# Patient Record
Sex: FEMALE | Race: WHITE | NOT HISPANIC OR LATINO | URBAN - METROPOLITAN AREA
[De-identification: names, ages, dates, MRNs, and addresses within clinical notes are randomized per-mention and may not be internally consistent; named-entity substitution may affect disease eponyms.]

---

## 2018-02-20 ENCOUNTER — OUTPATIENT (OUTPATIENT)
Dept: OUTPATIENT SERVICES | Facility: HOSPITAL | Age: 83
LOS: 1 days | Discharge: HOME | End: 2018-02-20
Payer: MEDICARE

## 2018-02-20 DIAGNOSIS — Z98.89 OTHER SPECIFIED POSTPROCEDURAL STATES: Chronic | ICD-10-CM

## 2018-02-20 LAB
INR BLD: 1.19 — HIGH (ref 0.88–1.16)
PROTHROM AB SERPL-ACNC: 13.2 SEC — HIGH (ref 9.8–12.7)

## 2018-02-20 PROCEDURE — 36415 COLL VENOUS BLD VENIPUNCTURE: CPT

## 2018-02-20 PROCEDURE — C1785: CPT

## 2018-02-20 PROCEDURE — 33228 REMV&REPLC PM GEN DUAL LEAD: CPT

## 2018-02-20 PROCEDURE — 85610 PROTHROMBIN TIME: CPT

## 2018-02-20 NOTE — PROGRESS NOTE ADULT - SUBJECTIVE AND OBJECTIVE BOX
Cardiac Electrophysiology Admission Note    History of Present Illness:  Ms. Hui is a 85 year old female with HTN, CAD s/p stent to oRCA, HLD, atrial fibrillation on coumadin and sick sinus syndrome s/p PPM, whose battery is at [facility] and presents for generator change.    Ms. Hui is unclear why she has a pacemaker but thinks it is because her heart rate was low at times.  She denies any syncope, near syncope, chest pain, SOB, orthopnea, PND, palpitations.  No device related complaints.  She now presents for an elective generator change.    Past Medical History:  as above + GERD  Past Surgical History:  PPM  Family History: Non-contributory  Social History: denies smoking, drugs, ETOH  Allergies: NKDA  Medications: Oxycodone PRN, Lasix 40mg, potassium 20mwq two tabs daily, lovastatin 20mg, coumadin 2mg, mitrampine 30mg, duloxetine 60, omeprazole  Physical:   HR: 60		BP: 125/78		O2 Sat 100%  	Temp: afebrile  GEN: NAD  HEENT: no JVD  CARDS: S1S2   LUNGS: CTAB   EXT: no edema  NEURO: no deficit noted- with confusion - but aware why she is here - HCP / daughter at bedside       A/P:  85 year old female with HTN, CAD s/p stent to oRCA, HLD, atrial fibrillation on coumadin and sick sinus syndrome s/p PPM, whose battery is at [facility] and presents for generator change. NPO and consented patient and daughter /HCP as well.  Ancef ismael procedure as per Dr. Kwong.

## 2021-03-02 ENCOUNTER — INPATIENT (INPATIENT)
Facility: HOSPITAL | Age: 86
LOS: 15 days | Discharge: NURSING, REHAB OR LONG-TERM CARE | DRG: 177 | End: 2021-03-18
Attending: INTERNAL MEDICINE | Admitting: INTERNAL MEDICINE
Payer: MEDICARE

## 2021-03-02 VITALS
OXYGEN SATURATION: 95 % | HEIGHT: 65 IN | DIASTOLIC BLOOD PRESSURE: 64 MMHG | SYSTOLIC BLOOD PRESSURE: 138 MMHG | HEART RATE: 68 BPM | RESPIRATION RATE: 20 BRPM | WEIGHT: 179.9 LBS | TEMPERATURE: 99 F

## 2021-03-02 DIAGNOSIS — Z98.89 OTHER SPECIFIED POSTPROCEDURAL STATES: Chronic | ICD-10-CM

## 2021-03-02 DIAGNOSIS — G93.40 ENCEPHALOPATHY, UNSPECIFIED: ICD-10-CM

## 2021-03-02 DIAGNOSIS — Z29.9 ENCOUNTER FOR PROPHYLACTIC MEASURES, UNSPECIFIED: ICD-10-CM

## 2021-03-02 DIAGNOSIS — U07.1 COVID-19: ICD-10-CM

## 2021-03-02 DIAGNOSIS — F03.90 UNSPECIFIED DEMENTIA WITHOUT BEHAVIORAL DISTURBANCE: ICD-10-CM

## 2021-03-02 DIAGNOSIS — I48.91 UNSPECIFIED ATRIAL FIBRILLATION: ICD-10-CM

## 2021-03-02 DIAGNOSIS — I10 ESSENTIAL (PRIMARY) HYPERTENSION: ICD-10-CM

## 2021-03-02 LAB
ALBUMIN SERPL ELPH-MCNC: 2.8 G/DL — LOW (ref 3.5–5)
ALP SERPL-CCNC: 95 U/L — SIGNIFICANT CHANGE UP (ref 40–120)
ALT FLD-CCNC: 31 U/L DA — SIGNIFICANT CHANGE UP (ref 10–60)
ANION GAP SERPL CALC-SCNC: 7 MMOL/L — SIGNIFICANT CHANGE UP (ref 5–17)
APPEARANCE UR: CLEAR — SIGNIFICANT CHANGE UP
AST SERPL-CCNC: 53 U/L — HIGH (ref 10–40)
BASOPHILS # BLD AUTO: 0.02 K/UL — SIGNIFICANT CHANGE UP (ref 0–0.2)
BASOPHILS NFR BLD AUTO: 0.5 % — SIGNIFICANT CHANGE UP (ref 0–2)
BILIRUB SERPL-MCNC: 0.6 MG/DL — SIGNIFICANT CHANGE UP (ref 0.2–1.2)
BILIRUB UR-MCNC: NEGATIVE — SIGNIFICANT CHANGE UP
BUN SERPL-MCNC: 18 MG/DL — SIGNIFICANT CHANGE UP (ref 7–18)
CALCIUM SERPL-MCNC: 8.3 MG/DL — LOW (ref 8.4–10.5)
CHLORIDE SERPL-SCNC: 110 MMOL/L — HIGH (ref 96–108)
CK SERPL-CCNC: 134 U/L — SIGNIFICANT CHANGE UP (ref 21–215)
CO2 SERPL-SCNC: 23 MMOL/L — SIGNIFICANT CHANGE UP (ref 22–31)
COLOR SPEC: YELLOW — SIGNIFICANT CHANGE UP
CREAT SERPL-MCNC: 1.04 MG/DL — SIGNIFICANT CHANGE UP (ref 0.5–1.3)
D DIMER BLD IA.RAPID-MCNC: 764 NG/ML DDU — HIGH
DIFF PNL FLD: NEGATIVE — SIGNIFICANT CHANGE UP
EOSINOPHIL # BLD AUTO: 0 K/UL — SIGNIFICANT CHANGE UP (ref 0–0.5)
EOSINOPHIL NFR BLD AUTO: 0 % — SIGNIFICANT CHANGE UP (ref 0–6)
GLUCOSE SERPL-MCNC: 92 MG/DL — SIGNIFICANT CHANGE UP (ref 70–99)
GLUCOSE UR QL: NEGATIVE — SIGNIFICANT CHANGE UP
HCT VFR BLD CALC: 45.8 % — HIGH (ref 34.5–45)
HGB BLD-MCNC: 15 G/DL — SIGNIFICANT CHANGE UP (ref 11.5–15.5)
IMM GRANULOCYTES NFR BLD AUTO: 1.2 % — SIGNIFICANT CHANGE UP (ref 0–1.5)
INR BLD: 1.39 RATIO — HIGH (ref 0.88–1.16)
KETONES UR-MCNC: NEGATIVE — SIGNIFICANT CHANGE UP
LEUKOCYTE ESTERASE UR-ACNC: NEGATIVE — SIGNIFICANT CHANGE UP
LYMPHOCYTES # BLD AUTO: 1.33 K/UL — SIGNIFICANT CHANGE UP (ref 1–3.3)
LYMPHOCYTES # BLD AUTO: 31.1 % — SIGNIFICANT CHANGE UP (ref 13–44)
MAGNESIUM SERPL-MCNC: 2.1 MG/DL — SIGNIFICANT CHANGE UP (ref 1.6–2.6)
MCHC RBC-ENTMCNC: 28.5 PG — SIGNIFICANT CHANGE UP (ref 27–34)
MCHC RBC-ENTMCNC: 32.8 GM/DL — SIGNIFICANT CHANGE UP (ref 32–36)
MCV RBC AUTO: 87.1 FL — SIGNIFICANT CHANGE UP (ref 80–100)
MONOCYTES # BLD AUTO: 0.49 K/UL — SIGNIFICANT CHANGE UP (ref 0–0.9)
MONOCYTES NFR BLD AUTO: 11.5 % — SIGNIFICANT CHANGE UP (ref 2–14)
NEUTROPHILS # BLD AUTO: 2.38 K/UL — SIGNIFICANT CHANGE UP (ref 1.8–7.4)
NEUTROPHILS NFR BLD AUTO: 55.7 % — SIGNIFICANT CHANGE UP (ref 43–77)
NITRITE UR-MCNC: NEGATIVE — SIGNIFICANT CHANGE UP
NRBC # BLD: 0 /100 WBCS — SIGNIFICANT CHANGE UP (ref 0–0)
PH UR: 5 — SIGNIFICANT CHANGE UP (ref 5–8)
PHOSPHATE SERPL-MCNC: 3 MG/DL — SIGNIFICANT CHANGE UP (ref 2.5–4.5)
PLATELET # BLD AUTO: 150 K/UL — SIGNIFICANT CHANGE UP (ref 150–400)
POTASSIUM SERPL-MCNC: 4.9 MMOL/L — SIGNIFICANT CHANGE UP (ref 3.5–5.3)
POTASSIUM SERPL-SCNC: 4.9 MMOL/L — SIGNIFICANT CHANGE UP (ref 3.5–5.3)
PROT SERPL-MCNC: 6.7 G/DL — SIGNIFICANT CHANGE UP (ref 6–8.3)
PROT UR-MCNC: 30 MG/DL
PROTHROM AB SERPL-ACNC: 16.3 SEC — HIGH (ref 10.6–13.6)
RBC # BLD: 5.26 M/UL — HIGH (ref 3.8–5.2)
RBC # FLD: 13.7 % — SIGNIFICANT CHANGE UP (ref 10.3–14.5)
SARS-COV-2 RNA SPEC QL NAA+PROBE: DETECTED
SODIUM SERPL-SCNC: 140 MMOL/L — SIGNIFICANT CHANGE UP (ref 135–145)
SP GR SPEC: 1.02 — SIGNIFICANT CHANGE UP (ref 1.01–1.02)
UROBILINOGEN FLD QL: NEGATIVE — SIGNIFICANT CHANGE UP
WBC # BLD: 4.27 K/UL — SIGNIFICANT CHANGE UP (ref 3.8–10.5)
WBC # FLD AUTO: 4.27 K/UL — SIGNIFICANT CHANGE UP (ref 3.8–10.5)

## 2021-03-02 PROCEDURE — G1004: CPT

## 2021-03-02 PROCEDURE — 71045 X-RAY EXAM CHEST 1 VIEW: CPT | Mod: 26

## 2021-03-02 PROCEDURE — 99285 EMERGENCY DEPT VISIT HI MDM: CPT

## 2021-03-02 PROCEDURE — 70450 CT HEAD/BRAIN W/O DYE: CPT | Mod: 26,ME

## 2021-03-02 PROCEDURE — 99223 1ST HOSP IP/OBS HIGH 75: CPT | Mod: GC

## 2021-03-02 RX ORDER — PANTOPRAZOLE SODIUM 20 MG/1
40 TABLET, DELAYED RELEASE ORAL DAILY
Refills: 0 | Status: DISCONTINUED | OUTPATIENT
Start: 2021-03-02 | End: 2021-03-03

## 2021-03-02 RX ORDER — ASPIRIN/CALCIUM CARB/MAGNESIUM 324 MG
81 TABLET ORAL DAILY
Refills: 0 | Status: DISCONTINUED | OUTPATIENT
Start: 2021-03-02 | End: 2021-03-18

## 2021-03-02 RX ORDER — OMEPRAZOLE 10 MG/1
0 CAPSULE, DELAYED RELEASE ORAL
Qty: 0 | Refills: 4 | DISCHARGE

## 2021-03-02 RX ORDER — DONEPEZIL HYDROCHLORIDE 10 MG/1
10 TABLET, FILM COATED ORAL AT BEDTIME
Refills: 0 | Status: DISCONTINUED | OUTPATIENT
Start: 2021-03-02 | End: 2021-03-18

## 2021-03-02 RX ORDER — DULOXETINE HYDROCHLORIDE 30 MG/1
60 CAPSULE, DELAYED RELEASE ORAL DAILY
Refills: 0 | Status: DISCONTINUED | OUTPATIENT
Start: 2021-03-02 | End: 2021-03-18

## 2021-03-02 RX ORDER — AMLODIPINE BESYLATE 2.5 MG/1
2.5 TABLET ORAL DAILY
Refills: 0 | Status: DISCONTINUED | OUTPATIENT
Start: 2021-03-02 | End: 2021-03-18

## 2021-03-02 RX ORDER — SODIUM CHLORIDE 9 MG/ML
1000 INJECTION INTRAMUSCULAR; INTRAVENOUS; SUBCUTANEOUS
Refills: 0 | Status: DISCONTINUED | OUTPATIENT
Start: 2021-03-02 | End: 2021-03-02

## 2021-03-02 RX ORDER — WARFARIN SODIUM 2.5 MG/1
0 TABLET ORAL
Qty: 0 | Refills: 5 | DISCHARGE

## 2021-03-02 RX ORDER — HALOPERIDOL DECANOATE 100 MG/ML
0.5 INJECTION INTRAMUSCULAR ONCE
Refills: 0 | Status: COMPLETED | OUTPATIENT
Start: 2021-03-02 | End: 2021-03-02

## 2021-03-02 RX ORDER — LOVASTATIN 20 MG
0 TABLET ORAL
Qty: 0 | Refills: 5 | DISCHARGE

## 2021-03-02 RX ORDER — MIRTAZAPINE 45 MG/1
15 TABLET, ORALLY DISINTEGRATING ORAL AT BEDTIME
Refills: 0 | Status: DISCONTINUED | OUTPATIENT
Start: 2021-03-02 | End: 2021-03-18

## 2021-03-02 RX ORDER — WARFARIN SODIUM 2.5 MG/1
2 TABLET ORAL ONCE
Refills: 0 | Status: DISCONTINUED | OUTPATIENT
Start: 2021-03-02 | End: 2021-03-03

## 2021-03-02 RX ADMIN — HALOPERIDOL DECANOATE 0.5 MILLIGRAM(S): 100 INJECTION INTRAMUSCULAR at 16:33

## 2021-03-02 NOTE — ED PROVIDER NOTE - PROGRESS NOTE DETAILS
COVID positive. Daughter informed. States now that patient's grandson, who lives in the home, tested positive for COVID. Patient not eating or drinking. Will require admission for IV fluids. Patient is resting comfortably, NAD. Daughter updated on patient's condition. Endorsed to Dr. Swanson

## 2021-03-02 NOTE — H&P ADULT - ATTENDING COMMENTS
Patient seen and examined on 3/2. Please refer to chart note on 3/2 for complete attending attestation.

## 2021-03-02 NOTE — H&P ADULT - ASSESSMENT
87 yo female with medical history significant for HTN, CHF, Afib on Coumadin, Dementia, comes in with lethargy and poor oral intake.    ED;  COVID positive    patient is being admitted to medicine for encephalopathy

## 2021-03-02 NOTE — H&P ADULT - PROBLEM SELECTOR PLAN 1
Presented with lethargy  CT head was negative  Likely secondary to infection and worsening of dementia  Was given IV fluids in ED  Will hold lasix as patient looks dehydrated  Will need PT   social eval

## 2021-03-02 NOTE — ED ADULT NURSE NOTE - NSIMPLEMENTINTERV_GEN_ALL_ED
Implemented All Fall Risk Interventions:  [place] to call system. Call bell, personal items and telephone within reach. Instruct patient to call for assistance. Room bathroom lighting operational. Non-slip footwear when patient is off stretcher. Physically safe environment: no spills, clutter or unnecessary equipment. Stretcher in lowest position, wheels locked, appropriate side rails in place. Provide visual cue, wrist band, yellow gown, etc. Monitor gait and stability. Monitor for mental status changes and reorient to person, place, and time. Review medications for side effects contributing to fall risk. Reinforce activity limits and safety measures with patient and family.

## 2021-03-02 NOTE — ED PROVIDER NOTE - CARE PLAN
Principal Discharge DX:	COVID-19  Secondary Diagnosis:	Altered mental status, unspecified altered mental status type

## 2021-03-02 NOTE — H&P ADULT - HISTORY OF PRESENT ILLNESS
87 yo female with medical history significant for HTN, CHF, Afib on Coumadin, Dementia, comes in with lethargy and poor oral intake. Patient's daughter reports patient has been declining for the past one week. At baseline, walks with a walker. Is confused but oriented to family members. For past 5 days, has been sleeping all day, not getting out of bed. When daughter tries to stand her up or move her to a chair, patient's legs buckle. Seems disoriented when daughter wakes her.  Pt didn't have any respiratory symptoms. Patients grandson who lives with the pt also tested positive for COVID. Pt has hx of chf on lasix 40 QD, pacemaker on warfarin 2 mg QD. Pt hasn't taken any home meds today.  89 yo female with medical history significant for HTN, CHF, Afib on Coumadin, Dementia, PPM comes in with lethargy and poor oral intake. Patient's daughter reports patient has been declining for the past one week. At baseline, walks with a walker. Is confused but oriented to family members. For past 5 days, has been sleeping all day, not getting out of bed. When daughter tries to stand her up or move her to a chair, patient's legs buckle. Seems disoriented when daughter wakes her.  Pt didn't have any respiratory symptoms. Patients grandson who lives with the pt also tested positive for COVID. Pt has hx of chf on lasix 40 QD, pacemaker on warfarin 2 mg QD. Pt hasn't taken any home meds today.

## 2021-03-02 NOTE — H&P ADULT - NSICDXPASTSURGICALHX_GEN_ALL_CORE_FT
PAST SURGICAL HISTORY:  S/P knee surgery     S/P spinal surgery S/P SPINAL CORD STIMULAR PLACEMENT

## 2021-03-02 NOTE — ED ADULT TRIAGE NOTE - CADM TRG TX PRIOR TO ARRIVAL
Fax received from [facility] with refill request for levothyroxine     According to 's note on 10.15.20:  ----- Message from Ginger Kerr MD sent at 10/15/2020  7:22 AM CDT -----  TSH is now elevated at 9.610.   Was 0.180 8 months ago. Her dose of levothyroxine was changed in Feb 2020 to 125mcq and plan was to recheck TSH in 6-8 weeks. Pt did not have it rechecked as advised but instead she has done it 8 months later! Change the levothyroxine to 125mcq Mon through Sat and 1.5 tabs on Sunday.   Needs to recheck TSH in 6-8 weeks not 8 months!!!       Patient states that she was supposed to have [facility] transfer her medication to [facility] but they were not able to. So now she is out of medication, patient would instead like this sent to [facility]. Informed patient that she is due for repeat lab work. Asked if patient can complete this prior to her appointment on Monday the 11th. Patient states that she will try. Advised patient that we will send a 30 day script for her until she does labs.    F/S 122/see ambulance record

## 2021-03-02 NOTE — CHART NOTE - NSCHARTNOTEFT_GEN_A_CORE
Patient seen and examined in ED. Spoke to patients daughter over the phone, pt was not feeling well, poor oral intake, lethargy for past 5 days. to the point she was not eating or drinking anything, unsteady gait and unable to care for her at home.  Pt didn't have any respiratory symptoms. Patients grandson who lives with the pt also tested positive for COVID.   Pt has hx of chf on lasix 40 QD, pacemaker on warfarin 2 mg QD. Pt hasn't taken any home meds today.     OE  Elderly female in NAD on room air, lethargic  dry mucosa  RRR no murmur  lungs clear to auscultation, no rales or rhonchi  Soft abd, NT, ND, +BS  No pedal edema  Awake, arousable, oriented to person    Labs. Imaging reviewed     Imp  Acute metabolic toxic encephalopathy likely secondary to COVID (non severe disease)  CHF chronic stable  ?Afib on Coumadin 2 mg QHS  Depression    D5half 80ml/hr for 12 hrs  Hold Lasix  Airborn/ contact isolation  Monitor saturation  Not a candidate  for dexa or Remdesevir given non severe disease  PT evaluation,  Daughter is willing to place her in [facility]  DVT ppx Patient seen and examined in ED. Spoke to patients daughter over the phone, pt was not feeling well, poor oral intake, lethargy for past 5 days. to the point she was not eating or drinking anything, unsteady gait and unable to care for her at home.  Pt didn't have any respiratory symptoms. Patients grandson who lives with the pt also tested positive for COVID.   Pt has hx of chf on lasix 40 QD, pacemaker on warfarin 2 mg QD. Pt hasn't taken any home meds today.     OE  Elderly female in NAD on room air, lethargic  dry mucosa  RRR no murmur  lungs clear to auscultation, no rales or rhonchi  Soft abd, NT, ND, +BS  No pedal edema  Awake, arousable, oriented to person    Labs. Imaging reviewed     Imp  Acute metabolic toxic encephalopathy likely secondary to COVID (non severe disease)  CHF chronic stable  ?Afib on Coumadin 2 mg QHS  Depression    D5half 80ml/hr for 12 hrs  Hold Lasix  Airborn/ contact isolation  Monitor saturation  Not a candidate  for dexa or Remdesevir given non severe disease  PT evaluation,  Daughter is willing to place her in [facility]  DVT ppx  Goals of care: DNR/DNI

## 2021-03-02 NOTE — H&P ADULT - NSHPPHYSICALEXAM_GEN_ALL_CORE
Vital Signs (24 Hrs):  T(C): 37.3 (03-02-21 @ 19:10), Max: 37.3 (03-02-21 @ 19:10)  HR: 62 (03-02-21 @ 19:10) (62 - 68)  BP: 105/66 (03-02-21 @ 19:10) (105/66 - 138/64)  RR: 20 (03-02-21 @ 19:10) (20 - 20)  SpO2: 96% (03-02-21 @ 19:10) (95% - 96%)

## 2021-03-02 NOTE — ED PROVIDER NOTE - CONSTITUTIONAL MENTATION
sleeping, opens eyes to voice. when asked questions, replies "I'm cold" in a clear voice/ALTERED LOC

## 2021-03-02 NOTE — ED ADULT NURSE NOTE - ISOLATION TYPE:
Airborne+Contact precautions Doxepin Pregnancy And Lactation Text: This medication is Pregnancy Category C and it isn't known if it is safe during pregnancy. It is also excreted in breast milk and breast feeding isn't recommended.

## 2021-03-02 NOTE — ED PROVIDER NOTE - OBJECTIVE STATEMENT
Patient's daughter reports patient has been declining for the past one week. At baseline, walks with a walker. Is confused but oriented to family members. For past 5 days, has been sleeping all day, not getting out of bed. When daughter tries to stand her up or move her to a chair, patient's legs buckle. Seems disoriented when daughter wakes her. PMD: Marlene

## 2021-03-02 NOTE — H&P ADULT - NSICDXFAMILYHX_GEN_ALL_CORE_FT
FAMILY HISTORY:  Family history of pancreatic cancer    Child  Still living? Unknown  Family history of breast cancer in female, Age at diagnosis: Age Unknown

## 2021-03-03 DIAGNOSIS — Z02.9 ENCOUNTER FOR ADMINISTRATIVE EXAMINATIONS, UNSPECIFIED: ICD-10-CM

## 2021-03-03 LAB
A1C WITH ESTIMATED AVERAGE GLUCOSE RESULT: 6.4 % — HIGH (ref 4–5.6)
ALBUMIN SERPL ELPH-MCNC: 2.5 G/DL — LOW (ref 3.5–5)
ALP SERPL-CCNC: 90 U/L — SIGNIFICANT CHANGE UP (ref 40–120)
ALT FLD-CCNC: 26 U/L DA — SIGNIFICANT CHANGE UP (ref 10–60)
AMMONIA BLD-MCNC: 27 UMOL/L — SIGNIFICANT CHANGE UP (ref 11–32)
ANION GAP SERPL CALC-SCNC: 7 MMOL/L — SIGNIFICANT CHANGE UP (ref 5–17)
AST SERPL-CCNC: 34 U/L — SIGNIFICANT CHANGE UP (ref 10–40)
BASOPHILS # BLD AUTO: 0.01 K/UL — SIGNIFICANT CHANGE UP (ref 0–0.2)
BASOPHILS NFR BLD AUTO: 0.3 % — SIGNIFICANT CHANGE UP (ref 0–2)
BILIRUB SERPL-MCNC: 0.5 MG/DL — SIGNIFICANT CHANGE UP (ref 0.2–1.2)
BUN SERPL-MCNC: 17 MG/DL — SIGNIFICANT CHANGE UP (ref 7–18)
CALCIUM SERPL-MCNC: 8 MG/DL — LOW (ref 8.4–10.5)
CHLORIDE SERPL-SCNC: 113 MMOL/L — HIGH (ref 96–108)
CHOLEST SERPL-MCNC: 104 MG/DL — SIGNIFICANT CHANGE UP
CO2 SERPL-SCNC: 25 MMOL/L — SIGNIFICANT CHANGE UP (ref 22–31)
CREAT SERPL-MCNC: 0.91 MG/DL — SIGNIFICANT CHANGE UP (ref 0.5–1.3)
CRP SERPL-MCNC: 29 MG/L — HIGH
CRP SERPL-MCNC: 30 MG/L — HIGH
EOSINOPHIL # BLD AUTO: 0.01 K/UL — SIGNIFICANT CHANGE UP (ref 0–0.5)
EOSINOPHIL NFR BLD AUTO: 0.3 % — SIGNIFICANT CHANGE UP (ref 0–6)
ERYTHROCYTE [SEDIMENTATION RATE] IN BLOOD: 18 MM/HR — SIGNIFICANT CHANGE UP (ref 0–20)
ESTIMATED AVERAGE GLUCOSE: 137 MG/DL — HIGH (ref 68–114)
FERRITIN SERPL-MCNC: 104 NG/ML — SIGNIFICANT CHANGE UP (ref 15–150)
FERRITIN SERPL-MCNC: 98 NG/ML — SIGNIFICANT CHANGE UP (ref 15–150)
FOLATE SERPL-MCNC: 19.1 NG/ML — SIGNIFICANT CHANGE UP
GLUCOSE SERPL-MCNC: 76 MG/DL — SIGNIFICANT CHANGE UP (ref 70–99)
HCT VFR BLD CALC: 43.9 % — SIGNIFICANT CHANGE UP (ref 34.5–45)
HDLC SERPL-MCNC: 31 MG/DL — LOW
HGB BLD-MCNC: 14.4 G/DL — SIGNIFICANT CHANGE UP (ref 11.5–15.5)
IMM GRANULOCYTES NFR BLD AUTO: 1 % — SIGNIFICANT CHANGE UP (ref 0–1.5)
INR BLD: 1.42 RATIO — HIGH (ref 0.88–1.16)
LDH SERPL L TO P-CCNC: 231 U/L — HIGH (ref 120–225)
LIPID PNL WITH DIRECT LDL SERPL: 49 MG/DL — SIGNIFICANT CHANGE UP
LYMPHOCYTES # BLD AUTO: 1.34 K/UL — SIGNIFICANT CHANGE UP (ref 1–3.3)
LYMPHOCYTES # BLD AUTO: 33.5 % — SIGNIFICANT CHANGE UP (ref 13–44)
MAGNESIUM SERPL-MCNC: 2.1 MG/DL — SIGNIFICANT CHANGE UP (ref 1.6–2.6)
MCHC RBC-ENTMCNC: 28.5 PG — SIGNIFICANT CHANGE UP (ref 27–34)
MCHC RBC-ENTMCNC: 32.8 GM/DL — SIGNIFICANT CHANGE UP (ref 32–36)
MCV RBC AUTO: 86.8 FL — SIGNIFICANT CHANGE UP (ref 80–100)
MONOCYTES # BLD AUTO: 0.44 K/UL — SIGNIFICANT CHANGE UP (ref 0–0.9)
MONOCYTES NFR BLD AUTO: 11 % — SIGNIFICANT CHANGE UP (ref 2–14)
NEUTROPHILS # BLD AUTO: 2.16 K/UL — SIGNIFICANT CHANGE UP (ref 1.8–7.4)
NEUTROPHILS NFR BLD AUTO: 53.9 % — SIGNIFICANT CHANGE UP (ref 43–77)
NON HDL CHOLESTEROL: 73 MG/DL — SIGNIFICANT CHANGE UP
NRBC # BLD: 0 /100 WBCS — SIGNIFICANT CHANGE UP (ref 0–0)
PHOSPHATE SERPL-MCNC: 2.9 MG/DL — SIGNIFICANT CHANGE UP (ref 2.5–4.5)
PLATELET # BLD AUTO: 156 K/UL — SIGNIFICANT CHANGE UP (ref 150–400)
POTASSIUM SERPL-MCNC: 3.8 MMOL/L — SIGNIFICANT CHANGE UP (ref 3.5–5.3)
POTASSIUM SERPL-SCNC: 3.8 MMOL/L — SIGNIFICANT CHANGE UP (ref 3.5–5.3)
PROCALCITONIN SERPL-MCNC: 0.1 NG/ML — SIGNIFICANT CHANGE UP (ref 0.02–0.1)
PROCALCITONIN SERPL-MCNC: 0.12 NG/ML — HIGH (ref 0.02–0.1)
PROT SERPL-MCNC: 5.8 G/DL — LOW (ref 6–8.3)
PROTHROM AB SERPL-ACNC: 16.6 SEC — HIGH (ref 10.6–13.6)
RBC # BLD: 5.06 M/UL — SIGNIFICANT CHANGE UP (ref 3.8–5.2)
RBC # FLD: 13.8 % — SIGNIFICANT CHANGE UP (ref 10.3–14.5)
SARS-COV-2 IGG SERPL QL IA: NEGATIVE — SIGNIFICANT CHANGE UP
SARS-COV-2 IGM SERPL IA-ACNC: 0.07 INDEX — SIGNIFICANT CHANGE UP
SODIUM SERPL-SCNC: 145 MMOL/L — SIGNIFICANT CHANGE UP (ref 135–145)
TRIGL SERPL-MCNC: 122 MG/DL — SIGNIFICANT CHANGE UP
TSH SERPL-MCNC: 1.61 UU/ML — SIGNIFICANT CHANGE UP (ref 0.34–4.82)
VIT B12 SERPL-MCNC: 1223 PG/ML — SIGNIFICANT CHANGE UP (ref 232–1245)
WBC # BLD: 4 K/UL — SIGNIFICANT CHANGE UP (ref 3.8–10.5)
WBC # FLD AUTO: 4 K/UL — SIGNIFICANT CHANGE UP (ref 3.8–10.5)

## 2021-03-03 PROCEDURE — 99233 SBSQ HOSP IP/OBS HIGH 50: CPT

## 2021-03-03 RX ORDER — PANTOPRAZOLE SODIUM 20 MG/1
40 TABLET, DELAYED RELEASE ORAL
Refills: 0 | Status: DISCONTINUED | OUTPATIENT
Start: 2021-03-03 | End: 2021-03-18

## 2021-03-03 RX ORDER — WARFARIN SODIUM 2.5 MG/1
3 TABLET ORAL ONCE
Refills: 0 | Status: COMPLETED | OUTPATIENT
Start: 2021-03-03 | End: 2021-03-03

## 2021-03-03 RX ADMIN — DONEPEZIL HYDROCHLORIDE 10 MILLIGRAM(S): 10 TABLET, FILM COATED ORAL at 22:06

## 2021-03-03 RX ADMIN — Medication 81 MILLIGRAM(S): at 11:53

## 2021-03-03 RX ADMIN — PANTOPRAZOLE SODIUM 40 MILLIGRAM(S): 20 TABLET, DELAYED RELEASE ORAL at 11:53

## 2021-03-03 RX ADMIN — MIRTAZAPINE 15 MILLIGRAM(S): 45 TABLET, ORALLY DISINTEGRATING ORAL at 22:06

## 2021-03-03 RX ADMIN — AMLODIPINE BESYLATE 2.5 MILLIGRAM(S): 2.5 TABLET ORAL at 05:37

## 2021-03-03 RX ADMIN — WARFARIN SODIUM 3 MILLIGRAM(S): 2.5 TABLET ORAL at 22:05

## 2021-03-03 RX ADMIN — DULOXETINE HYDROCHLORIDE 60 MILLIGRAM(S): 30 CAPSULE, DELAYED RELEASE ORAL at 11:53

## 2021-03-03 NOTE — PHYSICAL THERAPY INITIAL EVALUATION ADULT - PERTINENT HX OF CURRENT PROBLEM, REHAB EVAL
lethargy; generalized weakness; difficulty walking at home; COVID-19 positive; visually impaired (macular degeneration on both eyes as per daughter); hearing impaired

## 2021-03-03 NOTE — PATIENT PROFILE ADULT - NSPROPTRIGHTNOTIFYOBTAINDET_GEN_A_NUR
EXAM note    History  Helga Rios is a 54 year old female who presents follow-up of hypertriglyceridemia, during her last lipid panel, her triglycerides are slightly elevated at 255. She denies any dizziness, headaches or lightheadedness, no chest pain, shortness of breath or palpitations. However, patient has noted worsening memory. She had an MRI due to sinus symptoms, showed lesions in the brain, she saw the neurologist who recommended evaluation by neuropsychology, also recommended discussing triglycerides, however, patient does have a history of chronic pain taking morphine, and addition to alprazolam. Labs also ordered by neurology as well.    I have reviewed the past medical, family and social history sections including the medications and allergies listed in the above medical record as well as the nursing notes.    medical history  Past Medical History:   Diagnosis Date   • Anxiety    • Osteoporosis, unspecified 1/19/01       mEDICATIONS  Current Outpatient Medications   Medication Sig   • ranolazine (RANEXA) 500 MG 12 hr tablet Take 1 tablet by mouth 2 times daily.   • Azelastine-Fluticasone-NaCl 137-50 & 0.9 MCG/ACT-% Kit    • Fluticasone Propionate 93 MCG/ACT Exhaler Suspension    • fluticasone (FLONASE) 50 MCG/ACT nasal spray Spray 1 spray in each nostril daily.   • ibuprofen (MOTRIN) 200 MG tablet Take 600 mg by mouth every 6 hours as needed for Pain.   • ALPRAZolam (XANAX) 0.5 MG tablet Take 0.5 mg by mouth as needed.    • morphine (CASSANDRA) 30 MG 24 hr capsule Take 30 mg by mouth 2 times daily. Indications: Pain Lower back pain   • oxyCODONE/APAP (PERCOCET)  MG per tablet Take 1 tablet by mouth as needed for Pain. Lower back pain     No current facility-administered medications for this visit.        Review of systems  Constitutional:  Patient denies fever, chills, fatigue or malaise.  Eyes:  Denies visual disturbance, pain, burning or itching.  Respiratory:  Denies cough, shortness of breath,  wheezing, chest tightness.  Cardiovascular:  Denies chest pain, edema, palpitations.  Gastrointestinal:  Denies abdominal pain, nausea, vomiting, bloody stools or diarrhea.  Genitourinary:  Denies dysuria, urinary frequency, hematuria or nocturia.  Musculoskeletal:  Denies back pain, neck pain, joint pain or myalgias.  Integument:  Denies rash, itching.  Neurologic:  Denies headache, focal weakness, dizziness, syncope.  Endocrine:  Denies polyuria, polydipsia or temperature intolerance.  Psychiatric:  Denies depression or anxiety, sleep disturbance, suicidal ideation.      Physical Exam  Vital Signs:    Vitals:    07/05/19 1401   BP: 126/80   Pulse: 84   SpO2: 93%   Weight: 70.3 kg   Height: 5' 6\" (1.676 m)     Constitutional:  Well-developed, well-nourished. no acute distress.  Integument:  Warm. Dry. No erythema. No rash.  Neck:  Supple. No cervical lymphadenopathy. no thyromegaly. No carotid bruits.  Cardiovascular:  Normal heart rate. Normal rhythm. No murmurs. No rubs. No gallops. 2+ radial pulses. 2+ dorsalis pedis and posterior tibial pulses.  Respiratory:  Normal breath sounds. No respiratory distress. No wheezing, rales or rhonchi.  Gastrointestinal:  Bowel sounds normal. Soft. No tenderness. No distention. No masses. No hepatosplenomegaly.  Neurologic:  Alert and oriented x 3. Normal motor function. Normal sensory function.   Psychologic: Normal mood and normal behavior.    Assessment AND Plan  1) essential hypertriglyceridemia- patient counseled on diet condition exercise, she may try over-the-counter fissure Krill oil, flaxseed. We'll check lipids. If continues be elevated, may need to consider medication such as fenofibrate or gemfibrozil.    Patient verbalized understanding agreed to plan, return to clinic as scheduled or sooner p.r.n.     unable to assess

## 2021-03-03 NOTE — PROGRESS NOTE ADULT - PROBLEM SELECTOR PLAN 7
Patient from home   PT reccomending rehab  will need COVID negative prior to discharge Patient from home   PT recommending rehab  will need COVID negative prior to discharge

## 2021-03-03 NOTE — PHYSICAL THERAPY INITIAL EVALUATION ADULT - GENERAL OBSERVATIONS, REHAB EVAL
awake, alert, confused; oriented to self only; able to follow simple commands; + peripheral IV access on left forearm; currently breathing on room air

## 2021-03-03 NOTE — PHARMACOTHERAPY INTERVENTION NOTE - COMMENTS
87 y/o F on protonix 40mg QD day #2, recommended TO reevaluate and consider PO switch as pt qualifies;

## 2021-03-03 NOTE — PROGRESS NOTE ADULT - ASSESSMENT
87 yo female with medical history significant for HTN, CHF, Afib on Coumadin, Dementia, comes in with lethargy and poor oral intake admitted for acute encephalopathy secondary to COVID.

## 2021-03-03 NOTE — PHYSICAL THERAPY INITIAL EVALUATION ADULT - IMPAIRMENTS FOUND, PT EVAL
aerobic capacity/endurance/arousal, attention, and cognition/cognitive impairment/gait, locomotion, and balance/muscle strength/poor safety awareness/posture

## 2021-03-03 NOTE — PHYSICAL THERAPY INITIAL EVALUATION ADULT - ADDITIONAL COMMENTS
[facility] as per discussion with daughter Bianca, patient was previously independent in household ambulation using a rolling walker

## 2021-03-03 NOTE — PROGRESS NOTE ADULT - SUBJECTIVE AND OBJECTIVE BOX
-*-*-*INCOMPLETE     HPI:  87 yo female with medical history significant for HTN, CHF, Afib on Coumadin, Dementia, PPM comes in with lethargy and poor oral intake. Patient's daughter reports patient has been declining for the past one week. At baseline, walks with a walker. Is confused but oriented to family members. For past 5 days, has been sleeping all day, not getting out of bed. When daughter tries to stand her up or move her to a chair, patient's legs buckle. Seems disoriented when daughter wakes her.  Pt didn't have any respiratory symptoms. Patients grandson who lives with the pt also tested positive for COVID. Pt has hx of chf on lasix 40 QD, pacemaker on warfarin 2 mg QD. Pt hasn't taken any home meds today.  (02 Mar 2021 20:11)      Patient is a 88y old  Female who presents with a chief complaint of lethargy (02 Mar 2021 20:11)      INTERVAL HPI/OVERNIGHT EVENTS:  T(C): 36.4 (21 @ 05:17), Max: 37.3 (21 @ 19:10)  HR: 60 (21 @ 05:17) (59 - 68)  BP: 121/68 (21 @ 05:17) (105/66 - 149/50)  RR: 19 (21 @ 05:17) (19 - 20)  SpO2: 97% (21 @ 05:17) (95% - 97%)  Wt(kg): --  I&O's Summary      REVIEW OF SYSTEMS: denies fever, chills, SOB, palpitations, chest pain, abdominal pain, nausea, vomitting, diarrhea, constipation, dizziness    MEDICATIONS  (STANDING):  amLODIPine   Tablet 2.5 milliGRAM(s) Oral daily  aspirin enteric coated 81 milliGRAM(s) Oral daily  donepezil 10 milliGRAM(s) Oral at bedtime  DULoxetine 60 milliGRAM(s) Oral daily  mirtazapine 15 milliGRAM(s) Oral at bedtime  pantoprazole  Injectable 40 milliGRAM(s) IV Push daily  warfarin 2 milliGRAM(s) Oral once    MEDICATIONS  (PRN):      PHYSICAL EXAM:  GENERAL: NAD, well-groomed, well-developed  HEAD:  Atraumatic, Normocephalic  EYES: EOMI, PERRLA, conjunctiva and sclera clear  ENMT: No tonsillar erythema, exudates, or enlargement; Moist mucous membranes, Good dentition, No lesions  NECK: Supple, No JVD, Normal thyroid  NERVOUS SYSTEM:  Alert & Oriented X3, Good concentration; Motor Strength 5/5 B/L upper and lower extremities; DTRs 2+ intact and symmetric  CHEST/LUNG: Clear to percussion bilaterally; No rales, rhonchi, wheezing, or rubs  HEART: Regular rate and rhythm; No murmurs, rubs, or gallops  ABDOMEN: Soft, Nontender, Nondistended; Bowel sounds present  EXTREMITIES:  2+ Peripheral Pulses, No clubbing, cyanosis, or edema  LYMPH: No lymphadenopathy noted  SKIN: No rashes or lesions  LABS:                        15.0   4.27  )-----------( 150      ( 02 Mar 2021 16:16 )             45.8     03-02    140  |  110<H>  |  18  ----------------------------<  92  4.9   |  23  |  1.04    Ca    8.3<L>      02 Mar 2021 16:16  Phos  3.0     03-02  Mg     2.1     03-02    TPro  6.7  /  Alb  2.8<L>  /  TBili  0.6  /  DBili  x   /  AST  53<H>  /  ALT  31  /  AlkPhos  95  03-02    PT/INR - ( 02 Mar 2021 19:08 )   PT: 16.3 sec;   INR: 1.39 ratio           Urinalysis Basic - ( 02 Mar 2021 16:57 )    Color: Yellow / Appearance: Clear / S.020 / pH: x  Gluc: x / Ketone: Negative  / Bili: Negative / Urobili: Negative   Blood: x / Protein: 30 mg/dL / Nitrite: Negative   Leuk Esterase: Negative / RBC: 0-2 /HPF / WBC 0-2 /HPF   Sq Epi: x / Non Sq Epi: Occasional /HPF / Bacteria: Many /HPF      CAPILLARY BLOOD GLUCOSE            Urinalysis Basic - ( 02 Mar 2021 16:57 )    Color: Yellow / Appearance: Clear / S.020 / pH: x  Gluc: x / Ketone: Negative  / Bili: Negative / Urobili: Negative   Blood: x / Protein: 30 mg/dL / Nitrite: Negative   Leuk Esterase: Negative / RBC: 0-2 /HPF / WBC 0-2 /HPF   Sq Epi: x / Non Sq Epi: Occasional /HPF / Bacteria: Many /HPF     Patient is a 88y old  Female who presents with a chief complaint of lethargy (02 Mar 2021 20:11)    HPI:  89 yo female with medical history significant for HTN, CHF, Afib on Coumadin, Dementia, PPM comes in with lethargy and poor oral intake. Patient admitted for AMS, found to be COVID + chest xray with B/L infiltrates. Physical therapy consult recommends rehab, will need negative COVID prior to D.C.   INR subtherapeutic coumadin dose increased to 3mg    INTERVAL HPI/OVERNIGHT EVENTS:  T(C): 36.4 (21 @ 05:17), Max: 37.3 (21 @ 19:10)  HR: 60 (21 @ 05:17) (59 - 68)  BP: 121/68 (21 @ 05:17) (105/66 - 149/50)  RR: 19 (21 @ 05:17) (19 - 20)  SpO2: 97% (21 @ 05:17) (95% - 97%)  Wt(kg): --  I&O's Summary      REVIEW OF SYSTEMS: unable to participate     MEDICATIONS  (STANDING):  amLODIPine   Tablet 2.5 milliGRAM(s) Oral daily  aspirin enteric coated 81 milliGRAM(s) Oral daily  donepezil 10 milliGRAM(s) Oral at bedtime  DULoxetine 60 milliGRAM(s) Oral daily  mirtazapine 15 milliGRAM(s) Oral at bedtime  pantoprazole  Injectable 40 milliGRAM(s) IV Push daily  warfarin 2 milliGRAM(s) Oral once    MEDICATIONS  (PRN):    PHYSICAL EXAM:  GENERAL: chronically ill appearing female   HEAD:  Atraumatic, Normocephalic  EYES: EOMI, PERRLA, conjunctiva and sclera clear  ENMT: No tonsillar erythema, exudates, or enlargement;   NECK: Supple, No JVD, Normal thyroid  NERVOUS SYSTEM:  Alert & Oriented X1,   CHEST/LUNG: B.L Rhonci   HEART: Regular rate and rhythm; No murmurs, rubs, or gallops  ABDOMEN: Soft, Nontender, Nondistended; Bowel sounds present  EXTREMITIES:  2+ Peripheral Pulses, No clubbing, cyanosis, or edema  LYMPH: No lymphadenopathy noted  SKIN: No rashes or lesions  LABS:                        15.0   4.27  )-----------( 150      ( 02 Mar 2021 16:16 )             45.8     03-02    140  |  110<H>  |  18  ----------------------------<  92  4.9   |  23  |  1.04    Ca    8.3<L>      02 Mar 2021 16:16  Phos  3.0     03-02  Mg     2.1     03-02    TPro  6.7  /  Alb  2.8<L>  /  TBili  0.6  /  DBili  x   /  AST  53<H>  /  ALT  31  /  AlkPhos  95  03-02    PT/INR - ( 02 Mar 2021 19:08 )   PT: 16.3 sec;   INR: 1.39 ratio           Urinalysis Basic - ( 02 Mar 2021 16:57 )    Color: Yellow / Appearance: Clear / S.020 / pH: x  Gluc: x / Ketone: Negative  / Bili: Negative / Urobili: Negative   Blood: x / Protein: 30 mg/dL / Nitrite: Negative   Leuk Esterase: Negative / RBC: 0-2 /HPF / WBC 0-2 /HPF   Sq Epi: x / Non Sq Epi: Occasional /HPF / Bacteria: Many /HPF      CAPILLARY BLOOD GLUCOSE            Urinalysis Basic - ( 02 Mar 2021 16:57 )    Color: Yellow / Appearance: Clear / S.020 / pH: x  Gluc: x / Ketone: Negative  / Bili: Negative / Urobili: Negative   Blood: x / Protein: 30 mg/dL / Nitrite: Negative   Leuk Esterase: Negative / RBC: 0-2 /HPF / WBC 0-2 /HPF   Sq Epi: x / Non Sq Epi: Occasional /HPF / Bacteria: Many /HPF

## 2021-03-04 LAB
ANION GAP SERPL CALC-SCNC: 5 MMOL/L — SIGNIFICANT CHANGE UP (ref 5–17)
BUN SERPL-MCNC: 16 MG/DL — SIGNIFICANT CHANGE UP (ref 7–18)
CALCIUM SERPL-MCNC: 8 MG/DL — LOW (ref 8.4–10.5)
CHLORIDE SERPL-SCNC: 113 MMOL/L — HIGH (ref 96–108)
CO2 SERPL-SCNC: 25 MMOL/L — SIGNIFICANT CHANGE UP (ref 22–31)
CREAT SERPL-MCNC: 0.96 MG/DL — SIGNIFICANT CHANGE UP (ref 0.5–1.3)
CULTURE RESULTS: SIGNIFICANT CHANGE UP
GLUCOSE SERPL-MCNC: 85 MG/DL — SIGNIFICANT CHANGE UP (ref 70–99)
HCT VFR BLD CALC: 46.3 % — HIGH (ref 34.5–45)
HGB BLD-MCNC: 15.1 G/DL — SIGNIFICANT CHANGE UP (ref 11.5–15.5)
INR BLD: 1.57 RATIO — HIGH (ref 0.88–1.16)
MCHC RBC-ENTMCNC: 28.1 PG — SIGNIFICANT CHANGE UP (ref 27–34)
MCHC RBC-ENTMCNC: 32.6 GM/DL — SIGNIFICANT CHANGE UP (ref 32–36)
MCV RBC AUTO: 86.1 FL — SIGNIFICANT CHANGE UP (ref 80–100)
NRBC # BLD: 0 /100 WBCS — SIGNIFICANT CHANGE UP (ref 0–0)
PLATELET # BLD AUTO: 159 K/UL — SIGNIFICANT CHANGE UP (ref 150–400)
POTASSIUM SERPL-MCNC: 4.3 MMOL/L — SIGNIFICANT CHANGE UP (ref 3.5–5.3)
POTASSIUM SERPL-SCNC: 4.3 MMOL/L — SIGNIFICANT CHANGE UP (ref 3.5–5.3)
PROTHROM AB SERPL-ACNC: 18.3 SEC — HIGH (ref 10.6–13.6)
RBC # BLD: 5.38 M/UL — HIGH (ref 3.8–5.2)
RBC # FLD: 13.4 % — SIGNIFICANT CHANGE UP (ref 10.3–14.5)
SODIUM SERPL-SCNC: 143 MMOL/L — SIGNIFICANT CHANGE UP (ref 135–145)
SPECIMEN SOURCE: SIGNIFICANT CHANGE UP
WBC # BLD: 4.22 K/UL — SIGNIFICANT CHANGE UP (ref 3.8–10.5)
WBC # FLD AUTO: 4.22 K/UL — SIGNIFICANT CHANGE UP (ref 3.8–10.5)

## 2021-03-04 PROCEDURE — 99232 SBSQ HOSP IP/OBS MODERATE 35: CPT | Mod: GC

## 2021-03-04 RX ORDER — WARFARIN SODIUM 2.5 MG/1
3 TABLET ORAL ONCE
Refills: 0 | Status: COMPLETED | OUTPATIENT
Start: 2021-03-04 | End: 2021-03-04

## 2021-03-04 RX ADMIN — DULOXETINE HYDROCHLORIDE 60 MILLIGRAM(S): 30 CAPSULE, DELAYED RELEASE ORAL at 11:21

## 2021-03-04 RX ADMIN — AMLODIPINE BESYLATE 2.5 MILLIGRAM(S): 2.5 TABLET ORAL at 07:06

## 2021-03-04 RX ADMIN — DONEPEZIL HYDROCHLORIDE 10 MILLIGRAM(S): 10 TABLET, FILM COATED ORAL at 21:42

## 2021-03-04 RX ADMIN — MIRTAZAPINE 15 MILLIGRAM(S): 45 TABLET, ORALLY DISINTEGRATING ORAL at 21:41

## 2021-03-04 RX ADMIN — WARFARIN SODIUM 3 MILLIGRAM(S): 2.5 TABLET ORAL at 21:41

## 2021-03-04 RX ADMIN — PANTOPRAZOLE SODIUM 40 MILLIGRAM(S): 20 TABLET, DELAYED RELEASE ORAL at 09:39

## 2021-03-04 RX ADMIN — Medication 81 MILLIGRAM(S): at 11:21

## 2021-03-04 NOTE — PROGRESS NOTE ADULT - SUBJECTIVE AND OBJECTIVE BOX
NP Note discussed with  Primary Attending    Patient is a 88y old  Female who presents with a chief complaint of lethargy (03 Mar 2021 08:03)    HPI:  89 yo female with medical history significant for HTN, CHF, Afib on Coumadin, Dementia, PPM comes in with lethargy and poor oral intake. Patient admitted for AMS, found to be COVID + chest xray with B/L infiltrates. INR subtherapeutic coumadin dose increased  to 3mg, Physical therapy consult recommends rehab, will need negative COVID prior to D.C. Pending SW consult      INTERVAL HPI/OVERNIGHT EVENTS: NONE    MEDICATIONS  (STANDING):  amLODIPine   Tablet 2.5 milliGRAM(s) Oral daily  aspirin enteric coated 81 milliGRAM(s) Oral daily  donepezil 10 milliGRAM(s) Oral at bedtime  DULoxetine 60 milliGRAM(s) Oral daily  mirtazapine 15 milliGRAM(s) Oral at bedtime  pantoprazole    Tablet 40 milliGRAM(s) Oral before breakfast    MEDICATIONS  (PRN):  __________________________________________________  REVIEW OF SYSTEMS:    unable to participate due to MS      Vital Signs Last 24 Hrs  T(C): 36.9 (04 Mar 2021 13:19), Max: 36.9 (04 Mar 2021 13:19)  T(F): 98.5 (04 Mar 2021 13:19), Max: 98.5 (04 Mar 2021 13:19)  HR: 50 (04 Mar 2021 13:19) (50 - 72)  BP: 117/44 (04 Mar 2021 13:19) (114/84 - 149/64)  BP(mean): 62 (04 Mar 2021 13:19) (62 - 84)  RR: 16 (04 Mar 2021 13:19) (16 - 18)  SpO2: 94% (04 Mar 2021 13:19) (94% - 99%)    ________________________________________________    PHYSICAL EXAM:  GENERAL: chronically ill appearing female   HEAD:  Atraumatic, Normocephalic  EYES: EOMI, PERRLA, conjunctiva and sclera clear  ENMT: No tonsillar erythema, exudates, or enlargement;   NECK: Supple, No JVD, Normal thyroid  NERVOUS SYSTEM:  Alert & Oriented X1,   CHEST/LUNG: B.L Rhonci   HEART: Regular rate and rhythm; No murmurs, rubs, or gallops  ABDOMEN: Soft, Nontender, Nondistended; Bowel sounds present  EXTREMITIES:  2+ Peripheral Pulses, No clubbing, cyanosis, or edema  LYMPH: No lymphadenopathy noted  SKIN: No rashes or lesions    _________________________________________________  LABS:                        15.1   4.22  )-----------( 159      ( 04 Mar 2021 06:44 )             46.3     03-04    143  |  113<H>  |  16  ----------------------------<  85  4.3   |  25  |  0.96    Ca    8.0<L>      04 Mar 2021 06:44  Phos  2.9     03-03  Mg     2.1     03-03    TPro  5.8<L>  /  Alb  2.5<L>  /  TBili  0.5  /  DBili  x   /  AST  34  /  ALT  26  /  AlkPhos  90  03-03    PT/INR - ( 04 Mar 2021 06:44 )   PT: 18.3 sec;   INR: 1.57 ratio        Plan of care was discussed with patient and /or primary care giver; all questions and concerns were addressed and care was aligned with patient's wishes.

## 2021-03-04 NOTE — PROGRESS NOTE ADULT - PROBLEM SELECTOR PLAN 7
Patient from home   PT recommending rehab  will need COVID negative prior to discharge  pending SW consult

## 2021-03-05 LAB
ANION GAP SERPL CALC-SCNC: 9 MMOL/L — SIGNIFICANT CHANGE UP (ref 5–17)
APTT BLD: 34.5 SEC — SIGNIFICANT CHANGE UP (ref 27.5–35.5)
BUN SERPL-MCNC: 15 MG/DL — SIGNIFICANT CHANGE UP (ref 7–18)
CALCIUM SERPL-MCNC: 7.7 MG/DL — LOW (ref 8.4–10.5)
CHLORIDE SERPL-SCNC: 114 MMOL/L — HIGH (ref 96–108)
CO2 SERPL-SCNC: 21 MMOL/L — LOW (ref 22–31)
CREAT SERPL-MCNC: 0.97 MG/DL — SIGNIFICANT CHANGE UP (ref 0.5–1.3)
GLUCOSE SERPL-MCNC: 101 MG/DL — HIGH (ref 70–99)
HCT VFR BLD CALC: 40.4 % — SIGNIFICANT CHANGE UP (ref 34.5–45)
HGB BLD-MCNC: 13.6 G/DL — SIGNIFICANT CHANGE UP (ref 11.5–15.5)
INR BLD: 2.34 RATIO — HIGH (ref 0.88–1.16)
MCHC RBC-ENTMCNC: 28.5 PG — SIGNIFICANT CHANGE UP (ref 27–34)
MCHC RBC-ENTMCNC: 33.7 GM/DL — SIGNIFICANT CHANGE UP (ref 32–36)
MCV RBC AUTO: 84.7 FL — SIGNIFICANT CHANGE UP (ref 80–100)
NRBC # BLD: 0 /100 WBCS — SIGNIFICANT CHANGE UP (ref 0–0)
PLATELET # BLD AUTO: 143 K/UL — LOW (ref 150–400)
POTASSIUM SERPL-MCNC: 3.5 MMOL/L — SIGNIFICANT CHANGE UP (ref 3.5–5.3)
POTASSIUM SERPL-SCNC: 3.5 MMOL/L — SIGNIFICANT CHANGE UP (ref 3.5–5.3)
PROTHROM AB SERPL-ACNC: 26.7 SEC — HIGH (ref 10.6–13.6)
RBC # BLD: 4.77 M/UL — SIGNIFICANT CHANGE UP (ref 3.8–5.2)
RBC # FLD: 13.5 % — SIGNIFICANT CHANGE UP (ref 10.3–14.5)
SODIUM SERPL-SCNC: 144 MMOL/L — SIGNIFICANT CHANGE UP (ref 135–145)
WBC # BLD: 4.94 K/UL — SIGNIFICANT CHANGE UP (ref 3.8–10.5)
WBC # FLD AUTO: 4.94 K/UL — SIGNIFICANT CHANGE UP (ref 3.8–10.5)

## 2021-03-05 PROCEDURE — 99232 SBSQ HOSP IP/OBS MODERATE 35: CPT | Mod: GC

## 2021-03-05 RX ORDER — WARFARIN SODIUM 2.5 MG/1
2 TABLET ORAL ONCE
Refills: 0 | Status: COMPLETED | OUTPATIENT
Start: 2021-03-05 | End: 2021-03-05

## 2021-03-05 RX ADMIN — DULOXETINE HYDROCHLORIDE 60 MILLIGRAM(S): 30 CAPSULE, DELAYED RELEASE ORAL at 12:33

## 2021-03-05 RX ADMIN — MIRTAZAPINE 15 MILLIGRAM(S): 45 TABLET, ORALLY DISINTEGRATING ORAL at 22:33

## 2021-03-05 RX ADMIN — PANTOPRAZOLE SODIUM 40 MILLIGRAM(S): 20 TABLET, DELAYED RELEASE ORAL at 06:16

## 2021-03-05 RX ADMIN — WARFARIN SODIUM 2 MILLIGRAM(S): 2.5 TABLET ORAL at 22:32

## 2021-03-05 RX ADMIN — Medication 81 MILLIGRAM(S): at 12:33

## 2021-03-05 RX ADMIN — DONEPEZIL HYDROCHLORIDE 10 MILLIGRAM(S): 10 TABLET, FILM COATED ORAL at 22:32

## 2021-03-05 RX ADMIN — AMLODIPINE BESYLATE 2.5 MILLIGRAM(S): 2.5 TABLET ORAL at 05:16

## 2021-03-05 NOTE — PROGRESS NOTE ADULT - PROBLEM SELECTOR PLAN 3
Has h/o Afib, on coumadin  INR Subtherapeutic  increased dose to 3mg Has h/o Afib, on coumadin  INR now within therapeutic range  will continue 2mg for the next 3 days   trend INR

## 2021-03-05 NOTE — PROGRESS NOTE ADULT - SUBJECTIVE AND OBJECTIVE BOX
-*-**-INCOMPLETE     NP Note discussed with  Primary Attending    Patient is a 88y old  Female who presents with a chief complaint of lethargy (04 Mar 2021 17:27)      INTERVAL HPI/OVERNIGHT EVENTS: no new complaints    MEDICATIONS  (STANDING):  amLODIPine   Tablet 2.5 milliGRAM(s) Oral daily  aspirin enteric coated 81 milliGRAM(s) Oral daily  donepezil 10 milliGRAM(s) Oral at bedtime  DULoxetine 60 milliGRAM(s) Oral daily  mirtazapine 15 milliGRAM(s) Oral at bedtime  pantoprazole    Tablet 40 milliGRAM(s) Oral before breakfast  warfarin 2 milliGRAM(s) Oral once    MEDICATIONS  (PRN):      __________________________________________________  REVIEW OF SYSTEMS:    CONSTITUTIONAL: No fever,   EYES: no acute visual disturbances  NECK: No pain or stiffness  RESPIRATORY: No cough; No shortness of breath  CARDIOVASCULAR: No chest pain, no palpitations  GASTROINTESTINAL: No pain. No nausea or vomiting; No diarrhea   NEUROLOGICAL: No headache or numbness, no tremors  MUSCULOSKELETAL: No joint pain, no muscle pain  GENITOURINARY: no dysuria, no frequency, no hesitancy  PSYCHIATRY: no depression , no anxiety  ALL OTHER  ROS negative        Vital Signs Last 24 Hrs  T(C): 36.6 (05 Mar 2021 05:19), Max: 36.8 (04 Mar 2021 21:00)  T(F): 97.8 (05 Mar 2021 05:19), Max: 98.2 (04 Mar 2021 21:00)  HR: 56 (05 Mar 2021 05:19) (56 - 59)  BP: 152/78 (05 Mar 2021 05:19) (99/57 - 152/78)  BP(mean): --  RR: 16 (05 Mar 2021 05:19) (16 - 17)  SpO2: 99% (05 Mar 2021 05:19) (93% - 99%)    ________________________________________________  PHYSICAL EXAM:  GENERAL: NAD  HEENT: Normocephalic;  conjunctivae and sclerae clear; moist mucous membranes;   NECK : supple  CHEST/LUNG: Clear to auscultation bilaterally with good air entry   HEART: S1 S2  regular; no murmurs, gallops or rubs  ABDOMEN: Soft, Nontender, Nondistended; Bowel sounds present  EXTREMITIES: no cyanosis; no edema; no calf tenderness  SKIN: warm and dry; no rash  NERVOUS SYSTEM:  Awake and alert; Oriented  to place, person and time ; no new deficits    _________________________________________________  LABS:                        13.6   4.94  )-----------( 143      ( 05 Mar 2021 06:37 )             40.4     03-05    144  |  114<H>  |  15  ----------------------------<  101<H>  3.5   |  21<L>  |  0.97    Ca    7.7<L>      05 Mar 2021 06:37      PT/INR - ( 05 Mar 2021 06:37 )   PT: 26.7 sec;   INR: 2.34 ratio         PTT - ( 05 Mar 2021 06:37 )  PTT:34.5 sec    CAPILLARY BLOOD GLUCOSE            RADIOLOGY & ADDITIONAL TESTS:    Imaging Personally Reviewed:  YES/NO    Consultant(s) Notes Reviewed:   YES/ No    Care Discussed with Consultants :     Plan of care was discussed with patient and /or primary care giver; all questions and concerns were addressed and care was aligned with patient's wishes.     -*-**-INCOMPLETE     NP Note discussed with  Primary Attending    Patient is a 88y old  Female who presents with a chief complaint of lethargy (04 Mar 2021 17:27)  HPI:  87 yo female from home with medical history significant for HTN, CHF, Afib on Coumadin, Dementia, PPM comes in with lethargy and poor oral intake. Patient admitted for AMS, found to be COVID + chest xray with B/L infiltrates. INR now within theraputic range, will keep dose at 2mg. Physical therapy consult recommends [facility] , Will need negative COVID prior to D.C. Can be retested on 3/12         INTERVAL HPI/OVERNIGHT EVENTS: no new complaints    MEDICATIONS  (STANDING):  amLODIPine   Tablet 2.5 milliGRAM(s) Oral daily  aspirin enteric coated 81 milliGRAM(s) Oral daily  donepezil 10 milliGRAM(s) Oral at bedtime  DULoxetine 60 milliGRAM(s) Oral daily  mirtazapine 15 milliGRAM(s) Oral at bedtime  pantoprazole    Tablet 40 milliGRAM(s) Oral before breakfast  warfarin 2 milliGRAM(s) Oral once    MEDICATIONS  (PRN):      __________________________________________________  REVIEW OF SYSTEMS:  unable to participate in mental status       Vital Signs Last 24 Hrs  T(C): 36.6 (05 Mar 2021 05:19), Max: 36.8 (04 Mar 2021 21:00)  T(F): 97.8 (05 Mar 2021 05:19), Max: 98.2 (04 Mar 2021 21:00)  HR: 56 (05 Mar 2021 05:19) (56 - 59)  BP: 152/78 (05 Mar 2021 05:19) (99/57 - 152/78)  BP(mean): --  RR: 16 (05 Mar 2021 05:19) (16 - 17)  SpO2: 99% (05 Mar 2021 05:19) (93% - 99%)    ________________________________________________  PHYSICAL EXAM:  GENERAL: chronically ill appearing female   HEAD:  Atraumatic, Normocephalic  EYES: EOMI, PERRLA, conjunctiva and sclera clear  ENMT: No tonsillar erythema, exudates, or enlargement;   NECK: Supple, No JVD, Normal thyroid  NERVOUS SYSTEM:  Alert & Oriented X1,   CHEST/LUNG: B.L Rhonci   HEART: Regular rate and rhythm; No murmurs, rubs, or gallops  ABDOMEN: Soft, Nontender, Nondistended; Bowel sounds present  EXTREMITIES:  2+ Peripheral Pulses, No clubbing, cyanosis, or edema  LYMPH: No lymphadenopathy noted  SKIN: No rashes or lesions  _________________________________________________  LABS:                        13.6   4.94  )-----------( 143      ( 05 Mar 2021 06:37 )             40.4     03-05    144  |  114<H>  |  15  ----------------------------<  101<H>  3.5   |  21<L>  |  0.97    Ca    7.7<L>      05 Mar 2021 06:37      PT/INR - ( 05 Mar 2021 06:37 )   PT: 26.7 sec;   INR: 2.34 ratio         PTT - ( 05 Mar 2021 06:37 )  PTT:34.5 sec    CAPILLARY BLOOD GLUCOSE      Plan of care was discussed with patient and /or primary care giver; all questions and concerns were addressed and care was aligned with patient's wishes.     NP Note discussed with  Primary Attending    Patient is a 88y old  Female who presents with a chief complaint of lethargy (04 Mar 2021 17:27)  HPI:  87 yo female from home with medical history significant for HTN, CHF, Afib on Coumadin, Dementia, PPM comes in with lethargy and poor oral intake. Patient admitted for AMS, found to be COVID + chest xray with B/L infiltrates. INR now within theraputic range, will keep dose at 2mg. Physical therapy consult recommends [facility] , Will need negative COVID prior to D.C. Can be retested on 3/12         INTERVAL HPI/OVERNIGHT EVENTS: no new complaints    MEDICATIONS  (STANDING):  amLODIPine   Tablet 2.5 milliGRAM(s) Oral daily  aspirin enteric coated 81 milliGRAM(s) Oral daily  donepezil 10 milliGRAM(s) Oral at bedtime  DULoxetine 60 milliGRAM(s) Oral daily  mirtazapine 15 milliGRAM(s) Oral at bedtime  pantoprazole    Tablet 40 milliGRAM(s) Oral before breakfast  warfarin 2 milliGRAM(s) Oral once    MEDICATIONS  (PRN):      __________________________________________________  REVIEW OF SYSTEMS:  unable to participate in mental status       Vital Signs Last 24 Hrs  T(C): 36.6 (05 Mar 2021 05:19), Max: 36.8 (04 Mar 2021 21:00)  T(F): 97.8 (05 Mar 2021 05:19), Max: 98.2 (04 Mar 2021 21:00)  HR: 56 (05 Mar 2021 05:19) (56 - 59)  BP: 152/78 (05 Mar 2021 05:19) (99/57 - 152/78)  BP(mean): --  RR: 16 (05 Mar 2021 05:19) (16 - 17)  SpO2: 99% (05 Mar 2021 05:19) (93% - 99%)    ________________________________________________  PHYSICAL EXAM:  GENERAL: chronically ill appearing female   HEAD:  Atraumatic, Normocephalic  EYES: EOMI, PERRLA, conjunctiva and sclera clear  ENMT: No tonsillar erythema, exudates, or enlargement;   NECK: Supple, No JVD, Normal thyroid  NERVOUS SYSTEM:  Alert & Oriented X1,   CHEST/LUNG: B.L Rhonci   HEART: Regular rate and rhythm; No murmurs, rubs, or gallops  ABDOMEN: Soft, Nontender, Nondistended; Bowel sounds present  EXTREMITIES:  2+ Peripheral Pulses, No clubbing, cyanosis, or edema  LYMPH: No lymphadenopathy noted  SKIN: No rashes or lesions  _________________________________________________  LABS:                        13.6   4.94  )-----------( 143      ( 05 Mar 2021 06:37 )             40.4     03-05    144  |  114<H>  |  15  ----------------------------<  101<H>  3.5   |  21<L>  |  0.97    Ca    7.7<L>      05 Mar 2021 06:37      PT/INR - ( 05 Mar 2021 06:37 )   PT: 26.7 sec;   INR: 2.34 ratio         PTT - ( 05 Mar 2021 06:37 )  PTT:34.5 sec    CAPILLARY BLOOD GLUCOSE      Plan of care was discussed with patient and /or primary care giver; all questions and concerns were addressed and care was aligned with patient's wishes.

## 2021-03-05 NOTE — PROGRESS NOTE ADULT - PROBLEM SELECTOR PLAN 7
Patient from home   PT recommending rehab  will need COVID negative prior to discharge  pending SW consult Patient from home   PT recommending rehab  will need COVID negative prior to discharge

## 2021-03-06 LAB
ANION GAP SERPL CALC-SCNC: 8 MMOL/L — SIGNIFICANT CHANGE UP (ref 5–17)
BUN SERPL-MCNC: 13 MG/DL — SIGNIFICANT CHANGE UP (ref 7–18)
CALCIUM SERPL-MCNC: 7.9 MG/DL — LOW (ref 8.4–10.5)
CHLORIDE SERPL-SCNC: 112 MMOL/L — HIGH (ref 96–108)
CO2 SERPL-SCNC: 24 MMOL/L — SIGNIFICANT CHANGE UP (ref 22–31)
CREAT SERPL-MCNC: 0.97 MG/DL — SIGNIFICANT CHANGE UP (ref 0.5–1.3)
GLUCOSE SERPL-MCNC: 101 MG/DL — HIGH (ref 70–99)
INR BLD: 3.22 RATIO — HIGH (ref 0.88–1.16)
POTASSIUM SERPL-MCNC: 3.7 MMOL/L — SIGNIFICANT CHANGE UP (ref 3.5–5.3)
POTASSIUM SERPL-SCNC: 3.7 MMOL/L — SIGNIFICANT CHANGE UP (ref 3.5–5.3)
PROTHROM AB SERPL-ACNC: 36.2 SEC — HIGH (ref 10.6–13.6)
SODIUM SERPL-SCNC: 144 MMOL/L — SIGNIFICANT CHANGE UP (ref 135–145)

## 2021-03-06 PROCEDURE — 99232 SBSQ HOSP IP/OBS MODERATE 35: CPT | Mod: GC

## 2021-03-06 RX ORDER — WARFARIN SODIUM 2.5 MG/1
1 TABLET ORAL ONCE
Refills: 0 | Status: COMPLETED | OUTPATIENT
Start: 2021-03-06 | End: 2021-03-06

## 2021-03-06 RX ADMIN — WARFARIN SODIUM 1 MILLIGRAM(S): 2.5 TABLET ORAL at 21:20

## 2021-03-06 RX ADMIN — DULOXETINE HYDROCHLORIDE 60 MILLIGRAM(S): 30 CAPSULE, DELAYED RELEASE ORAL at 11:33

## 2021-03-06 RX ADMIN — PANTOPRAZOLE SODIUM 40 MILLIGRAM(S): 20 TABLET, DELAYED RELEASE ORAL at 06:59

## 2021-03-06 RX ADMIN — AMLODIPINE BESYLATE 2.5 MILLIGRAM(S): 2.5 TABLET ORAL at 06:59

## 2021-03-06 RX ADMIN — Medication 81 MILLIGRAM(S): at 11:33

## 2021-03-06 RX ADMIN — MIRTAZAPINE 15 MILLIGRAM(S): 45 TABLET, ORALLY DISINTEGRATING ORAL at 21:20

## 2021-03-06 RX ADMIN — DONEPEZIL HYDROCHLORIDE 10 MILLIGRAM(S): 10 TABLET, FILM COATED ORAL at 21:20

## 2021-03-06 NOTE — PROGRESS NOTE ADULT - SUBJECTIVE AND OBJECTIVE BOX
Patient is a 88y old  Female who presents with a chief complaint of lethargy (04 Mar 2021 17:27)  HPI:  87 yo female from home with medical history significant for HTN, CHF, Afib on Coumadin, Dementia, PPM comes in with lethargy and poor oral intake. Patient admitted for AMS, found to be COVID + chest xray with B/L infiltrates. INR now within theraputic range, will keep dose at 2mg. Physical therapy consult recommends [facility] , Will need negative COVID prior to D.C.         INTERVAL HPI/OVERNIGHT EVENTS: No overnight events. saturating well on room air    MEDICATIONS  (STANDING):  amLODIPine   Tablet 2.5 milliGRAM(s) Oral daily  aspirin enteric coated 81 milliGRAM(s) Oral daily  donepezil 10 milliGRAM(s) Oral at bedtime  DULoxetine 60 milliGRAM(s) Oral daily  mirtazapine 15 milliGRAM(s) Oral at bedtime  pantoprazole    Tablet 40 milliGRAM(s) Oral before breakfast  warfarin 2 milliGRAM(s) Oral once    MEDICATIONS  (PRN):      __________________________________________________  REVIEW OF SYSTEMS:  unable to participate in mental status     Vital Signs Last 24 Hrs  T(C): 36.3 (06 Mar 2021 12:42), Max: 36.9 (06 Mar 2021 06:13)  T(F): 97.4 (06 Mar 2021 12:42), Max: 98.5 (06 Mar 2021 06:13)  HR: 62 (06 Mar 2021 12:42) (56 - 62)  BP: 134/50 (06 Mar 2021 12:42) (109/44 - 134/62)  BP(mean): --  RR: 18 (06 Mar 2021 12:42) (14 - 18)  SpO2: 98% (06 Mar 2021 12:42) (95% - 98%)    ________________________________________________  PHYSICAL EXAM:  GENERAL: chronically ill appearing female   HEAD:  Atraumatic, Normocephalic  NERVOUS SYSTEM:  Alert & Oriented X1,   CHEST/LUNG: clear lungs  HEART: Regular rate and rhythm; No murmurs, rubs, or gallops  ABDOMEN: Soft, Nontender, Nondistended; Bowel sounds present  EXTREMITIES: no edema    SKIN: No rashes or lesions  _________________________________________________  LABS:                                   13.6   4.94  )-----------( 143      ( 05 Mar 2021 06:37 )             40.4     03-06    144  |  112<H>  |  13  ----------------------------<  101<H>  3.7   |  24  |  0.97    Ca    7.9<L>      06 Mar 2021 05:53        Plan of care was discussed with patient and /or primary care giver; all questions and concerns were addressed and care was aligned with patient's wishes.

## 2021-03-06 NOTE — PROGRESS NOTE ADULT - ASSESSMENT
87 yo female with medical history significant for HTN, CHF, Afib on Coumadin, Dementia, comes in with lethargy and poor oral intake admitted for acute encephalopathy.        Imp  Acute metabolic toxic encephalopathy likely secondary to COVID (non severe disease) mental status improved  CHF chronic stable  Afib on Coumadin now with supra therapeutic INR  Depression      Airborn/ contact isolation  Monitor saturation  Not a candidate  for Dexa or Remdesevir given non severe disease  PT evaluation recommended [facility], will stay inhouse until the infectious period is over.  Coumadin 1 Mg tonight  Daughter is willing to place her in [facility]  DVT ppx  Goals of care: DNR/DNI.

## 2021-03-07 ENCOUNTER — TRANSCRIPTION ENCOUNTER (OUTPATIENT)
Age: 86
End: 2021-03-07

## 2021-03-07 LAB
ALBUMIN SERPL ELPH-MCNC: 2.2 G/DL — LOW (ref 3.5–5)
ALP SERPL-CCNC: 95 U/L — SIGNIFICANT CHANGE UP (ref 40–120)
ALT FLD-CCNC: 26 U/L DA — SIGNIFICANT CHANGE UP (ref 10–60)
ANION GAP SERPL CALC-SCNC: 7 MMOL/L — SIGNIFICANT CHANGE UP (ref 5–17)
APTT BLD: 48.4 SEC — HIGH (ref 27.5–35.5)
AST SERPL-CCNC: 41 U/L — HIGH (ref 10–40)
BASOPHILS # BLD AUTO: 0.01 K/UL — SIGNIFICANT CHANGE UP (ref 0–0.2)
BASOPHILS NFR BLD AUTO: 0.2 % — SIGNIFICANT CHANGE UP (ref 0–2)
BILIRUB SERPL-MCNC: 0.5 MG/DL — SIGNIFICANT CHANGE UP (ref 0.2–1.2)
BUN SERPL-MCNC: 18 MG/DL — SIGNIFICANT CHANGE UP (ref 7–18)
CALCIUM SERPL-MCNC: 8 MG/DL — LOW (ref 8.4–10.5)
CHLORIDE SERPL-SCNC: 116 MMOL/L — HIGH (ref 96–108)
CO2 SERPL-SCNC: 22 MMOL/L — SIGNIFICANT CHANGE UP (ref 22–31)
CREAT SERPL-MCNC: 1.17 MG/DL — SIGNIFICANT CHANGE UP (ref 0.5–1.3)
EOSINOPHIL # BLD AUTO: 0.04 K/UL — SIGNIFICANT CHANGE UP (ref 0–0.5)
EOSINOPHIL NFR BLD AUTO: 0.7 % — SIGNIFICANT CHANGE UP (ref 0–6)
GLUCOSE SERPL-MCNC: 95 MG/DL — SIGNIFICANT CHANGE UP (ref 70–99)
HCT VFR BLD CALC: 42.9 % — SIGNIFICANT CHANGE UP (ref 34.5–45)
HGB BLD-MCNC: 13.9 G/DL — SIGNIFICANT CHANGE UP (ref 11.5–15.5)
IMM GRANULOCYTES NFR BLD AUTO: 1 % — SIGNIFICANT CHANGE UP (ref 0–1.5)
INR BLD: 3.32 RATIO — HIGH (ref 0.88–1.16)
LYMPHOCYTES # BLD AUTO: 1.41 K/UL — SIGNIFICANT CHANGE UP (ref 1–3.3)
LYMPHOCYTES # BLD AUTO: 23.1 % — SIGNIFICANT CHANGE UP (ref 13–44)
MCHC RBC-ENTMCNC: 27.7 PG — SIGNIFICANT CHANGE UP (ref 27–34)
MCHC RBC-ENTMCNC: 32.4 GM/DL — SIGNIFICANT CHANGE UP (ref 32–36)
MCV RBC AUTO: 85.6 FL — SIGNIFICANT CHANGE UP (ref 80–100)
MONOCYTES # BLD AUTO: 0.6 K/UL — SIGNIFICANT CHANGE UP (ref 0–0.9)
MONOCYTES NFR BLD AUTO: 9.8 % — SIGNIFICANT CHANGE UP (ref 2–14)
NEUTROPHILS # BLD AUTO: 3.99 K/UL — SIGNIFICANT CHANGE UP (ref 1.8–7.4)
NEUTROPHILS NFR BLD AUTO: 65.2 % — SIGNIFICANT CHANGE UP (ref 43–77)
NRBC # BLD: 0 /100 WBCS — SIGNIFICANT CHANGE UP (ref 0–0)
PLATELET # BLD AUTO: 193 K/UL — SIGNIFICANT CHANGE UP (ref 150–400)
POTASSIUM SERPL-MCNC: 4 MMOL/L — SIGNIFICANT CHANGE UP (ref 3.5–5.3)
POTASSIUM SERPL-SCNC: 4 MMOL/L — SIGNIFICANT CHANGE UP (ref 3.5–5.3)
PROT SERPL-MCNC: 5.6 G/DL — LOW (ref 6–8.3)
PROTHROM AB SERPL-ACNC: 37.3 SEC — HIGH (ref 10.6–13.6)
RBC # BLD: 5.01 M/UL — SIGNIFICANT CHANGE UP (ref 3.8–5.2)
RBC # FLD: 13.7 % — SIGNIFICANT CHANGE UP (ref 10.3–14.5)
SODIUM SERPL-SCNC: 145 MMOL/L — SIGNIFICANT CHANGE UP (ref 135–145)
WBC # BLD: 6.11 K/UL — SIGNIFICANT CHANGE UP (ref 3.8–10.5)
WBC # FLD AUTO: 6.11 K/UL — SIGNIFICANT CHANGE UP (ref 3.8–10.5)

## 2021-03-07 PROCEDURE — 99232 SBSQ HOSP IP/OBS MODERATE 35: CPT | Mod: GC

## 2021-03-07 RX ORDER — WARFARIN SODIUM 2.5 MG/1
1 TABLET ORAL ONCE
Refills: 0 | Status: COMPLETED | OUTPATIENT
Start: 2021-03-07 | End: 2021-03-07

## 2021-03-07 RX ADMIN — Medication 81 MILLIGRAM(S): at 10:36

## 2021-03-07 RX ADMIN — WARFARIN SODIUM 1 MILLIGRAM(S): 2.5 TABLET ORAL at 21:52

## 2021-03-07 RX ADMIN — DONEPEZIL HYDROCHLORIDE 10 MILLIGRAM(S): 10 TABLET, FILM COATED ORAL at 21:52

## 2021-03-07 RX ADMIN — PANTOPRAZOLE SODIUM 40 MILLIGRAM(S): 20 TABLET, DELAYED RELEASE ORAL at 06:20

## 2021-03-07 RX ADMIN — MIRTAZAPINE 15 MILLIGRAM(S): 45 TABLET, ORALLY DISINTEGRATING ORAL at 21:52

## 2021-03-07 RX ADMIN — DULOXETINE HYDROCHLORIDE 60 MILLIGRAM(S): 30 CAPSULE, DELAYED RELEASE ORAL at 10:36

## 2021-03-07 NOTE — DISCHARGE NOTE PROVIDER - CARE PROVIDER_API CALL
Manuel Cruz (DO)  Internal Medicine  [address] [id number]  Phone: [phone]  Fax: [phone]  Follow Up Time:

## 2021-03-07 NOTE — DISCHARGE NOTE PROVIDER - NSDCMRMEDTOKEN_GEN_ALL_CORE_FT
AMLODIPINE   TAB 2.5M TABLET ONCE A DAY ORALLY 30 DAYS  aspirin 81 mg oral delayed release tablet: 1 tab(s) orally once a day  cholecalciferol oral tablet: 1000 unit(s) orally once a day  donepezil 10 mg oral tablet: 1 tab(s) orally once a day (at bedtime)  DULoxetine 60 mg oral delayed release capsule: 1 cap(s) orally once a day  KLOR-CON M20 TAB 20MEQ ER: 1 TABLET ONCE A DAY ORALLY 30 DAYS  Lasix 40 mg oral tablet:  orally EVERY OTHER DAY as needed for leg swelling  LOVASTATIN   TAB 20M TABLET ONCE A DAY ORALLY 30 DAYS  mirtazapine 15 mg oral tablet: 1 tab(s) orally once a day (at bedtime)  OMEPRAZOLE   CAP 20MG: TAKE ONE CAPSULE BY MOUTH ONCE DAILY  WARFARIN     TAB 2MG: TAKE ONE TABLET BY MOUTH ONCE DAILY DAILY ORALLY 30 DAYS   amLODIPine 2.5 mg oral tablet: 1 tab(s) orally once a day  aspirin 81 mg oral delayed release tablet: 1 tab(s) orally once a day  cholecalciferol oral tablet: 1000 unit(s) orally once a day  donepezil 10 mg oral tablet: 1 tab(s) orally once a day (at bedtime)  DULoxetine 60 mg oral delayed release capsule: 1 cap(s) orally once a day  KLOR-CON M20 TAB 20MEQ ER: 1 TABLET ONCE A DAY ORALLY 30 DAYS  Lasix 40 mg oral tablet:  orally EVERY OTHER DAY as needed for leg swelling  LOVASTATIN   TAB 20M TABLET ONCE A DAY ORALLY 30 DAYS  mirtazapine 15 mg oral tablet: 1 tab(s) orally once a day (at bedtime)  OMEPRAZOLE   CAP 20MG: TAKE ONE CAPSULE BY MOUTH ONCE DAILY  WARFARIN     TAB 2MG: TAKE ONE TABLET BY MOUTH ONCE DAILY DAILY ORALLY 30 DAYS   amLODIPine 2.5 mg oral tablet: 1 tab(s) orally once a day  aspirin 81 mg oral delayed release tablet: 1 tab(s) orally once a day  cholecalciferol oral tablet: 1000 unit(s) orally once a day  donepezil 10 mg oral tablet: 1 tab(s) orally once a day (at bedtime)  DULoxetine 60 mg oral delayed release capsule: 1 cap(s) orally once a day  Lasix 40 mg oral tablet:  orally EVERY OTHER DAY as needed for leg swelling  LOVASTATIN   TAB 20M TABLET ONCE A DAY ORALLY 30 DAYS  mirtazapine 15 mg oral tablet: 1 tab(s) orally once a day (at bedtime)  OMEPRAZOLE   CAP 20MG: TAKE ONE CAPSULE BY MOUTH ONCE DAILY  WARFARIN     TAB 2MG: TAKE ONE TABLET BY MOUTH ONCE DAILY DAILY ORALLY 30 DAYS

## 2021-03-07 NOTE — PROGRESS NOTE ADULT - SUBJECTIVE AND OBJECTIVE BOX
Patient is a 88y old  Female who presents with a chief complaint of lethargy     HPI:  89 yo female from home with medical history significant for HTN, CHF, Afib on Coumadin, Dementia, PPM comes in with lethargy and poor oral intake. Patient admitted for AMS, found to be COVID + chest xray with B/L infiltrates. INR now within theraputic range, will keep dose at 2mg. Physical therapy consult recommends [facility] , Will need negative COVID prior to D.C.         INTERVAL HPI/OVERNIGHT EVENTS: No overnight events. Saturating well on room air    MEDICATIONS  (STANDING):  amLODIPine   Tablet 2.5 milliGRAM(s) Oral daily  aspirin enteric coated 81 milliGRAM(s) Oral daily  donepezil 10 milliGRAM(s) Oral at bedtime  DULoxetine 60 milliGRAM(s) Oral daily  mirtazapine 15 milliGRAM(s) Oral at bedtime  pantoprazole    Tablet 40 milliGRAM(s) Oral before breakfast  warfarin 2 milliGRAM(s) Oral once    MEDICATIONS  (PRN):      __________________________________________________  REVIEW OF SYSTEMS:  unable to participate in mental status     Vital Signs Last 24 Hrs  T(C): 36.9 (07 Mar 2021 05:46), Max: 36.9 (07 Mar 2021 05:46)  T(F): 98.4 (07 Mar 2021 05:46), Max: 98.4 (07 Mar 2021 05:46)  HR: 57 (07 Mar 2021 05:46) (57 - 66)  BP: 108/50 (07 Mar 2021 05:46) (108/50 - 134/50)  BP(mean): --  RR: 18 (07 Mar 2021 05:46) (18 - 18)  SpO2: 97% (07 Mar 2021 05:46) (97% - 98%)    ________________________________________________  PHYSICAL EXAM:  GENERAL: chronically ill appearing female   HEAD:  Atraumatic, Normocephalic  NERVOUS SYSTEM:  Alert & Oriented X1,   CHEST/LUNG: clear lungs  HEART: Regular rate and rhythm; No murmurs, rubs, or gallops  ABDOMEN: Soft, Nontender, Nondistended; Bowel sounds present  EXTREMITIES: no edema    SKIN: No rashes or lesions  _________________________________________________  LABS:                                   13.9   6.11  )-----------( 193      ( 07 Mar 2021 06:46 )             42.9     03-07    145  |  116<H>  |  18  ----------------------------<  95  4.0   |  22  |  1.17    Ca    8.0<L>      07 Mar 2021 06:46    TPro  5.6<L>  /  Alb  2.2<L>  /  TBili  0.5  /  DBili  x   /  AST  41<H>  /  ALT  26  /  AlkPhos  95  03-07         Plan of care was discussed with patient and /or primary care giver; all questions and concerns were addressed and care was aligned with patient's wishes.

## 2021-03-07 NOTE — DISCHARGE NOTE PROVIDER - HOSPITAL COURSE
87 yo female with medical history significant for HTN, CHF, Afib on Coumadin, Dementia, PPM comes in with lethargy and poor oral intake. Patient's daughter reports patient has been declining for the past one week. At baseline, walks with a walker. Is confused but oriented to family members. For past 5 days, has been sleeping all day, not getting out of bed. When daughter tries to stand her up or move her to a chair, patient's legs buckle, also noted disorientation. Pt didn't have any respiratory symptoms. Patients grandson who lives with the pt also tested positive for COVID. Pt has hx of chf on lasix 40 QD, pacemaker on warfarin 2 mg QD. Patient turned out to be COVID pos, CT head negative . Since saturating well on Room air, Dexamethasone nor Remdesivir was giving at this admission. PT was consulted, recommeded JACK, Needs neg swab before discharge    INCOMPLETE   89 yo female with medical history significant for HTN, CHF, Afib on Coumadin, Dementia, PPM comes in with lethargy and poor oral intake. Patient's daughter reports patient has been declining for the past one week. At baseline, walks with a walker. pt is confused but oriented to family members. Patients grandson who lives with the pt tested positive for COVID. Pt has hx of chf on lasix 40 QD, pacemaker on warfarin 2 mg QD. Patient turned out to be COVID pos, CT head negative . Since saturating well on Room air, Dexamethasone nor Remdesivir was giving at this admission. PT was consulted, recommended JACK, passed 10 days of hospital piriod from initial positive COVID date, pt will be d/suresh to [facility] for rehab.   pt is medically optimized to be transferred  to rehab.     Please refer to Pt's complete medical chart with documents for a full hospital course, for this is only a brief summary. 87 yo female with medical history significant for HTN, CHF, Afib on Coumadin, Dementia, PPM comes in with lethargy and poor oral intake. Patient's daughter reports patient has been declining for the past one week. At baseline, walks with a walker. pt is confused but oriented to family members. Patients grandson who lives with the pt tested positive for COVID. Pt has hx of chf on lasix 40 QD, pacemaker on warfarin 2 mg QD. Patient turned out to be COVID pos, CT head negative . Since saturating well on Room air, Dexamethasone nor Remdesivir was giving at this admission. PT was consulted, recommended JACK, passed 10 days of hospital piriod from initial positive COVID date, pt will be d/suresh to [facility] for rehab.   pt is medically optimized to be transferred  to rehab.     >>>>>>>>>>>>>>>>>>>>>>>>>>>>>>>>>>>>INCOMPLETE>>>>>>>>>>>>>>>>>>>>>>>>>>>>>>>>> 87 yo female with medical history significant for HTN, CHF, Afib on Coumadin, Dementia, PPM comes in with lethargy and poor oral intake. Patient's daughter reports patient has been declining for the past one week. At baseline, walks with a walker. pt is confused but oriented to family members. Patients grandson who lives with the pt tested positive for COVID. Pt has hx of chf on lasix 40 QD, pacemaker on warfarin 2 mg QD. Patient turned out to be COVID pos, CT head negative . Since saturating well on Room air, Dexamethasone nor Remdesivir was giving at this admission. PT was consulted, recommended JACK, passed 10 days of hospital piriod from initial positive COVID date, pt will be d/suresh to [facility] for rehab.   pt is medically optimized to be transferred  to rehab.    Patient is 88 year old female with medical history significant for HTN, CHF, Afib on Coumadin, Dementia, PPM comes in with lethargy and poor oral intake. Patient's daughter reports patient has been declining for the past one week. At baseline, walks with a walker. pt is confused but oriented to family members. Patients grandson who lives with the pt tested positive for COVID. Pt has hx of chf on lasix 40 QD, pacemaker on warfarin 2 mg QD. Patient turned out to be COVID pos, CT head negative . Since saturating well on Room air, Dexamethasone nor Remdesivir was giving at this admission. PT was consulted, recommended JACK, passed 10 days of hospital piriod from initial positive COVID date, pt will be d/suresh to [facility] for rehab.   pt is medically optimized to be transferred  to rehab.     Given patient's improved clinical status and current hemodynamic stability, decision was made to discharge.  Please refer to patient's complete medical chart with documents for a full hospital course, for this is only a brief summary.   Patient is 88 year old female with medical history significant for HTN, CHF, Afib on Coumadin, Dementia, PPM comes in with lethargy and poor oral intake. Patient's daughter reports patient has been declining for the past one week. At baseline, walks with a walker. pt is confused but oriented to family members. Patients grandson who lives with the pt tested positive for COVID. Pt has hx of chf on lasix 40 QD, pacemaker on warfarin 2 mg QD. Patient turned out to be COVID pos, CT head negative . Since saturating well on Room air, Dexamethasone nor Remdesivir was giving at this admission. PT was consulted, recommended JACK, passed 10 days of hospital piriod from initial positive COVID date, pt will be d/suresh to [facility] for rehab.   pt is medically optimized to be transferred  to rehab.     Given patient's improved clinical status and current hemodynamic stability, decision was made to discharge.  Please refer to patient's complete medical chart with documents for a full hospital course, for this is only a brief summary.    Attending attestation  Case discussed with NP.  I have personally seen and evaluated the patient.  Was resting comfortably in bed this morning.  No complaints of SOB or other concerns.    On exam, lungs clear.    Medications:  removed potassium from her med list.  If having issues and needing lasix her potassium should be monitored and repleted as needed; did not get the PO potassium here.  Monitor her INR per building protocol    DC time spent: 35 minutes  DOS 3/18/2021  Rolando Shafer MD FACP Patient is 88 year old female with medical history significant for HTN, CHF, Afib on Coumadin, Dementia, PPM comes in with lethargy and poor oral intake. Patient's daughter reports patient has been declining for the past one week. At baseline, walks with a walker. pt is confused but oriented to family members. Patients grandson who lives with the pt tested positive for COVID. Pt has hx of chf on lasix 40 QD, pacemaker on warfarin 2 mg QD. Patient turned out to be COVID pos, CT head negative . Since saturating well on Room air, Dexamethasone nor Remdesivir was giving at this admission. PT was consulted, recommended JACK, passed 10 days of hospital piriod from initial positive COVID date, pt will be d/suresh to [facility] for rehab.   pt is medically optimized to be transferred  to rehab.     Given patient's improved clinical status and current hemodynamic stability, decision was made to discharge.  Please refer to patient's complete medical chart with documents for a full hospital course, for this is only a brief summary.    Attending attestation  Case discussed with NP.  I have personally seen and evaluated the patient.  Was resting comfortably in bed this morning.  No complaints of SOB or other concerns.    On exam, lungs clear.     Medications:  removed potassium from her med list.  If having issues and needing lasix her potassium should be monitored and repleted as needed; did not get the PO potassium here.  Monitor her INR per building protocol    DC time spent: 35 minutes  DOS 3/18/2021  Rolando Shafer MD FACP

## 2021-03-07 NOTE — PROGRESS NOTE ADULT - ASSESSMENT
87 yo female with medical history significant for HTN, CHF, Afib on Coumadin, Dementia, comes in with lethargy and poor oral intake admitted for acute encephalopathy.    No overnight events    Imp  Acute metabolic toxic encephalopathy likely secondary to COVID (non severe disease) mental status- improved  CHF chronic stable  Afib on Coumadin now with supra therapeutic INR  Depression      Airborn/ contact isolation  Monitor saturation  Not a candidate  for Dexa or Remdesevir given non severe disease  PT evaluation recommended JACK, will stay inhouse until the infectious period is over (COVID PCR positive on 3/2)  Coumadin 1 Mg tonight monitor INR  Daughter is willing to place her in [facility]  DVT ppx  Goals of care: DNR/DNI.

## 2021-03-07 NOTE — DISCHARGE NOTE PROVIDER - NSDCCAREPROVSEEN_GEN_ALL_CORE_FT
[facility], Ariadna Keenan Sky, Emelyn Velazquez, Ariadna Shafer, Rolando

## 2021-03-08 LAB
ALBUMIN SERPL ELPH-MCNC: 2.2 G/DL — LOW (ref 3.5–5)
ALP SERPL-CCNC: 102 U/L — SIGNIFICANT CHANGE UP (ref 40–120)
ALT FLD-CCNC: 26 U/L DA — SIGNIFICANT CHANGE UP (ref 10–60)
ANION GAP SERPL CALC-SCNC: 7 MMOL/L — SIGNIFICANT CHANGE UP (ref 5–17)
APTT BLD: 47.4 SEC — HIGH (ref 27.5–35.5)
AST SERPL-CCNC: 37 U/L — SIGNIFICANT CHANGE UP (ref 10–40)
BASOPHILS # BLD AUTO: 0.01 K/UL — SIGNIFICANT CHANGE UP (ref 0–0.2)
BASOPHILS NFR BLD AUTO: 0.2 % — SIGNIFICANT CHANGE UP (ref 0–2)
BILIRUB SERPL-MCNC: 0.6 MG/DL — SIGNIFICANT CHANGE UP (ref 0.2–1.2)
BUN SERPL-MCNC: 20 MG/DL — HIGH (ref 7–18)
CALCIUM SERPL-MCNC: 8.1 MG/DL — LOW (ref 8.4–10.5)
CHLORIDE SERPL-SCNC: 115 MMOL/L — HIGH (ref 96–108)
CO2 SERPL-SCNC: 24 MMOL/L — SIGNIFICANT CHANGE UP (ref 22–31)
CREAT SERPL-MCNC: 1.01 MG/DL — SIGNIFICANT CHANGE UP (ref 0.5–1.3)
EOSINOPHIL # BLD AUTO: 0.08 K/UL — SIGNIFICANT CHANGE UP (ref 0–0.5)
EOSINOPHIL NFR BLD AUTO: 1.5 % — SIGNIFICANT CHANGE UP (ref 0–6)
GLUCOSE SERPL-MCNC: 83 MG/DL — SIGNIFICANT CHANGE UP (ref 70–99)
HCT VFR BLD CALC: 42.4 % — SIGNIFICANT CHANGE UP (ref 34.5–45)
HGB BLD-MCNC: 13.7 G/DL — SIGNIFICANT CHANGE UP (ref 11.5–15.5)
IMM GRANULOCYTES NFR BLD AUTO: 0.7 % — SIGNIFICANT CHANGE UP (ref 0–1.5)
INR BLD: 3.27 RATIO — HIGH (ref 0.88–1.16)
LYMPHOCYTES # BLD AUTO: 1.38 K/UL — SIGNIFICANT CHANGE UP (ref 1–3.3)
LYMPHOCYTES # BLD AUTO: 25.7 % — SIGNIFICANT CHANGE UP (ref 13–44)
MCHC RBC-ENTMCNC: 27.7 PG — SIGNIFICANT CHANGE UP (ref 27–34)
MCHC RBC-ENTMCNC: 32.3 GM/DL — SIGNIFICANT CHANGE UP (ref 32–36)
MCV RBC AUTO: 85.8 FL — SIGNIFICANT CHANGE UP (ref 80–100)
MONOCYTES # BLD AUTO: 0.51 K/UL — SIGNIFICANT CHANGE UP (ref 0–0.9)
MONOCYTES NFR BLD AUTO: 9.5 % — SIGNIFICANT CHANGE UP (ref 2–14)
MRSA PCR RESULT.: SIGNIFICANT CHANGE UP
NEUTROPHILS # BLD AUTO: 3.36 K/UL — SIGNIFICANT CHANGE UP (ref 1.8–7.4)
NEUTROPHILS NFR BLD AUTO: 62.4 % — SIGNIFICANT CHANGE UP (ref 43–77)
NRBC # BLD: 0 /100 WBCS — SIGNIFICANT CHANGE UP (ref 0–0)
PLATELET # BLD AUTO: 203 K/UL — SIGNIFICANT CHANGE UP (ref 150–400)
POTASSIUM SERPL-MCNC: 4 MMOL/L — SIGNIFICANT CHANGE UP (ref 3.5–5.3)
POTASSIUM SERPL-SCNC: 4 MMOL/L — SIGNIFICANT CHANGE UP (ref 3.5–5.3)
PROT SERPL-MCNC: 5.5 G/DL — LOW (ref 6–8.3)
PROTHROM AB SERPL-ACNC: 36.8 SEC — HIGH (ref 10.6–13.6)
RBC # BLD: 4.94 M/UL — SIGNIFICANT CHANGE UP (ref 3.8–5.2)
RBC # FLD: 13.6 % — SIGNIFICANT CHANGE UP (ref 10.3–14.5)
S AUREUS DNA NOSE QL NAA+PROBE: DETECTED
SARS-COV-2 RNA SPEC QL NAA+PROBE: DETECTED
SODIUM SERPL-SCNC: 146 MMOL/L — HIGH (ref 135–145)
WBC # BLD: 5.38 K/UL — SIGNIFICANT CHANGE UP (ref 3.8–10.5)
WBC # FLD AUTO: 5.38 K/UL — SIGNIFICANT CHANGE UP (ref 3.8–10.5)

## 2021-03-08 PROCEDURE — 99232 SBSQ HOSP IP/OBS MODERATE 35: CPT

## 2021-03-08 RX ORDER — WARFARIN SODIUM 2.5 MG/1
1 TABLET ORAL ONCE
Refills: 0 | Status: COMPLETED | OUTPATIENT
Start: 2021-03-08 | End: 2021-03-08

## 2021-03-08 RX ADMIN — DONEPEZIL HYDROCHLORIDE 10 MILLIGRAM(S): 10 TABLET, FILM COATED ORAL at 21:20

## 2021-03-08 RX ADMIN — Medication 81 MILLIGRAM(S): at 10:06

## 2021-03-08 RX ADMIN — MIRTAZAPINE 15 MILLIGRAM(S): 45 TABLET, ORALLY DISINTEGRATING ORAL at 21:20

## 2021-03-08 RX ADMIN — DULOXETINE HYDROCHLORIDE 60 MILLIGRAM(S): 30 CAPSULE, DELAYED RELEASE ORAL at 10:06

## 2021-03-08 RX ADMIN — PANTOPRAZOLE SODIUM 40 MILLIGRAM(S): 20 TABLET, DELAYED RELEASE ORAL at 05:09

## 2021-03-08 RX ADMIN — AMLODIPINE BESYLATE 2.5 MILLIGRAM(S): 2.5 TABLET ORAL at 05:09

## 2021-03-08 RX ADMIN — WARFARIN SODIUM 1 MILLIGRAM(S): 2.5 TABLET ORAL at 21:20

## 2021-03-08 NOTE — DIETITIAN INITIAL EVALUATION ADULT. - PERTINENT LABORATORY DATA
03-08 Na146 mmol/L<H> Glu 83 mg/dL K+ 4.0 mmol/L Cr  1.01 mg/dL BUN 20 mg/dL<H> 03-03 Phos 2.9 mg/dL 03-08 Alb 2.2 g/dL<L> 03-03 Chol 104 mg/dL LDL --    HDL 31 mg/dL<L> Trig 122 mg/dL

## 2021-03-08 NOTE — PROGRESS NOTE ADULT - SUBJECTIVE AND OBJECTIVE BOX
NP Note discussed with  primary attending    Patient is a 88y old  Female who presents with a chief complaint of lethargy (07 Mar 2021 11:40)  89 yo female with medical history significant for HTN, CHF, Afib on Coumadin, Dementia, PPM comes in with lethargy and poor oral intake. Patient's daughter reports patient has been declining for the past one week. At baseline, walks with a walker. Is confused but oriented to family members. For past 5 days, has been sleeping all day, not getting out of bed. When daughter tries to stand her up or move her to a chair, patient's legs buckle, also noted disorientation. Pt didn't have any respiratory symptoms. Patients grandson who lives with the pt also tested positive for COVID. Pt has hx of chf on lasix 40 QD, pacemaker on warfarin 2 mg QD. Patient turned out to be COVID pos, CT head negative . Since saturating well on Room air, Dexamethasone nor Remdesivir was given at this admission. PT was consulted, recommeded JACK, Needs neg swab before discharge. Pending COVID swab for today          INTERVAL HPI/OVERNIGHT EVENTS: No overnight events    MEDICATIONS  (STANDING):  amLODIPine   Tablet 2.5 milliGRAM(s) Oral daily  aspirin enteric coated 81 milliGRAM(s) Oral daily  donepezil 10 milliGRAM(s) Oral at bedtime  DULoxetine 60 milliGRAM(s) Oral daily  mirtazapine 15 milliGRAM(s) Oral at bedtime  pantoprazole    Tablet 40 milliGRAM(s) Oral before breakfast  warfarin 1 milliGRAM(s) Oral once    MEDICATIONS  (PRN):      __________________________________________________  REVIEW OF SYSTEMS:    ROS could not be done sec to poor cognition      Vital Signs Last 24 Hrs  T(C): 36.6 (08 Mar 2021 12:12), Max: 36.6 (07 Mar 2021 20:20)  T(F): 97.9 (08 Mar 2021 12:12), Max: 97.9 (08 Mar 2021 12:12)  HR: 58 (08 Mar 2021 12:12) (56 - 72)  BP: 114/41 (08 Mar 2021 12:12) (105/61 - 133/57)  BP(mean): 59 (08 Mar 2021 12:12) (59 - 59)  RR: 16 (08 Mar 2021 12:12) (16 - 18)  SpO2: 95% (08 Mar 2021 12:12) (95% - 99%)    ________________________________________________  PHYSICAL EXAM:  GENERAL: NAD  HEENT: Normocephalic;  conjunctivae and sclerae clear; moist mucous membranes;   NECK : supple  CHEST/LUNG: Clear to auscultation bilaterally , diminished to bases   HEART: S1 S2  regular; no murmurs, gallops or rubs  ABDOMEN: Soft, Nontender, Nondistended; Bowel sounds present  EXTREMITIES: no cyanosis; no edema; no calf tenderness  SKIN: warm and dry; no rash  NERVOUS SYSTEM:  Open eyes to voice, Able to say her name, do not know how old she is , dont know where she is     _________________________________________________  LABS:                        13.7   5.38  )-----------( 203      ( 08 Mar 2021 06:34 )             42.4     03-08    146<H>  |  115<H>  |  20<H>  ----------------------------<  83  4.0   |  24  |  1.01    Ca    8.1<L>      08 Mar 2021 06:34    TPro  5.5<L>  /  Alb  2.2<L>  /  TBili  0.6  /  DBili  x   /  AST  37  /  ALT  26  /  AlkPhos  102  03-08    PT/INR - ( 08 Mar 2021 06:34 )   PT: 36.8 sec;   INR: 3.27 ratio         PTT - ( 08 Mar 2021 06:34 )  PTT:47.4 sec    CAPILLARY BLOOD GLUCOSE            RADIOLOGY & ADDITIONAL TESTS:      EXAM:  XR CHEST PORTABLE URGENT 1V                            PROCEDURE DATE:  03/02/2021          INTERPRETATION:  AP supine chest on March 2, 2021 at 7:55 PM. Patient has altered mental status and is Covid positive.    Heart suggest enlargement. Left-sided pacemaker with spinal stimulator from below again noted.    There is some infiltration scattered in the mid lower lung fields mild in degree. This is new since August 13, 2014 and is consistent with Covid pneumonia.    IMPRESSION: Bilateral infiltrates. Other findings stable as above.    EXAM:  CT BRAIN                            PROCEDURE DATE:  03/02/2021          INTERPRETATION:  CT OF THE HEAD WITHOUT CONTRAST    CLINICAL INDICATION: Mental status change    TECHNIQUE: Volumetric CT acquisition was performed through the brain and reviewed using brain and bone window technique. Dose optimization techniques were utilized including kVp/mA modulation along with iterative reconstructions.      COMPARISON: CT head 8/14/2014    FINDINGS:    The ventricles, cisterns and sulci are prominent, consistent with generalized volume loss..   There is no acute loss of gray-white differentiation. There are mild patchy areas of hypodensity in the periventricular and subcortical white matter which are likely related to chronic microangiopathic changes. There is an empty sella.    There is no evidence of mass effect, midline shift, acute intracranial hemorrhage, or extra-axial collections.     The calvarium is intact. There is opacification of the left frontal sinus. There is a polyp versus retention cyst in the right maxillary sinus.The mastoid air cells are predominantly clear. The orbits are unremarkable.      IMPRESSION:  No acute intracranial hemorrhage or acute territorial infarction.           NP Note discussed with  primary attending    Patient is a 88y old  Female who presents with a chief complaint of lethargy (07 Mar 2021 11:40)  89 yo female with medical history significant for HTN, CHF, Afib on Coumadin, Dementia, PPM comes in with lethargy and poor oral intake. Patient's daughter reports patient has been declining for the past one week. At baseline, walks with a walker. Is confused but oriented to family members. For past 5 days, has been sleeping all day, not getting out of bed. When daughter tries to stand her up or move her to a chair, patient's legs buckle, also noted disorientation. Pt didn't have any respiratory symptoms. Patients grandson who lives with the pt also tested positive for COVID. Pt has hx of chf on lasix 40 QD, pacemaker on warfarin 2 mg QD. Patient turned out to be COVID pos, CT head negative . Since saturating well on Room air, Dexamethasone nor Remdesivir was given at this admission. PT was consulted, recommeded JACK, Needs neg swab before discharge. Pending COVID swab for today        INTERVAL HPI/OVERNIGHT EVENTS: No overnight events    MEDICATIONS  (STANDING):  amLODIPine   Tablet 2.5 milliGRAM(s) Oral daily  aspirin enteric coated 81 milliGRAM(s) Oral daily  donepezil 10 milliGRAM(s) Oral at bedtime  DULoxetine 60 milliGRAM(s) Oral daily  mirtazapine 15 milliGRAM(s) Oral at bedtime  pantoprazole    Tablet 40 milliGRAM(s) Oral before breakfast  warfarin 1 milliGRAM(s) Oral once    MEDICATIONS  (PRN):      __________________________________________________  REVIEW OF SYSTEMS:    ROS could not be done sec to poor cognition      Vital Signs Last 24 Hrs  T(C): 36.6 (08 Mar 2021 12:12), Max: 36.6 (07 Mar 2021 20:20)  T(F): 97.9 (08 Mar 2021 12:12), Max: 97.9 (08 Mar 2021 12:12)  HR: 58 (08 Mar 2021 12:12) (56 - 72)  BP: 114/41 (08 Mar 2021 12:12) (105/61 - 133/57)  BP(mean): 59 (08 Mar 2021 12:12) (59 - 59)  RR: 16 (08 Mar 2021 12:12) (16 - 18)  SpO2: 95% (08 Mar 2021 12:12) (95% - 99%)    ________________________________________________  PHYSICAL EXAM:  GENERAL: NAD  HEENT: Normocephalic;  conjunctivae and sclerae clear; moist mucous membranes;   NECK : supple  CHEST/LUNG: Clear to auscultation bilaterally , diminished to bases   HEART: S1 S2  regular; no murmurs, gallops or rubs  ABDOMEN: Soft, Nontender, Nondistended; Bowel sounds present  EXTREMITIES: no cyanosis; no edema; no calf tenderness  SKIN: warm and dry; no rash  NERVOUS SYSTEM:  Open eyes to voice, Able to say her name, do not know how old she is , dont know where she is     _________________________________________________  LABS:                        13.7   5.38  )-----------( 203      ( 08 Mar 2021 06:34 )             42.4     03-08    146<H>  |  115<H>  |  20<H>  ----------------------------<  83  4.0   |  24  |  1.01    Ca    8.1<L>      08 Mar 2021 06:34    TPro  5.5<L>  /  Alb  2.2<L>  /  TBili  0.6  /  DBili  x   /  AST  37  /  ALT  26  /  AlkPhos  102  03-08    PT/INR - ( 08 Mar 2021 06:34 )   PT: 36.8 sec;   INR: 3.27 ratio         PTT - ( 08 Mar 2021 06:34 )  PTT:47.4 sec    CAPILLARY BLOOD GLUCOSE            RADIOLOGY & ADDITIONAL TESTS:      EXAM:  XR CHEST PORTABLE URGENT 1V                            PROCEDURE DATE:  03/02/2021          INTERPRETATION:  AP supine chest on March 2, 2021 at 7:55 PM. Patient has altered mental status and is Covid positive.    Heart suggest enlargement. Left-sided pacemaker with spinal stimulator from below again noted.    There is some infiltration scattered in the mid lower lung fields mild in degree. This is new since August 13, 2014 and is consistent with Covid pneumonia.    IMPRESSION: Bilateral infiltrates. Other findings stable as above.    EXAM:  CT BRAIN                            PROCEDURE DATE:  03/02/2021          INTERPRETATION:  CT OF THE HEAD WITHOUT CONTRAST    CLINICAL INDICATION: Mental status change    TECHNIQUE: Volumetric CT acquisition was performed through the brain and reviewed using brain and bone window technique. Dose optimization techniques were utilized including kVp/mA modulation along with iterative reconstructions.      COMPARISON: CT head 8/14/2014    FINDINGS:    The ventricles, cisterns and sulci are prominent, consistent with generalized volume loss..   There is no acute loss of gray-white differentiation. There are mild patchy areas of hypodensity in the periventricular and subcortical white matter which are likely related to chronic microangiopathic changes. There is an empty sella.    There is no evidence of mass effect, midline shift, acute intracranial hemorrhage, or extra-axial collections.     The calvarium is intact. There is opacification of the left frontal sinus. There is a polyp versus retention cyst in the right maxillary sinus.The mastoid air cells are predominantly clear. The orbits are unremarkable.      IMPRESSION:  No acute intracranial hemorrhage or acute territorial infarction.

## 2021-03-08 NOTE — DIETITIAN INITIAL EVALUATION ADULT. - OTHER INFO
unable to interview patient face to face as has covid-19. will not contact patient On extension in room as confused per RN. Per MD, patient with poor po intake for 1 week PTA.  Per RN, patient consuming meals. No GI issues. provide Mechanical soft, DASH/TLC, dysphagia 2 mechanical soft, thin liquids as ordered. skin- No pressure ulcers

## 2021-03-08 NOTE — PROGRESS NOTE ADULT - PROBLEM SELECTOR PLAN 7
Patient from home   PT recommending JACK  will need COVID negative prior to discharge  COVID PCR ordered for today

## 2021-03-08 NOTE — DIETITIAN INITIAL EVALUATION ADULT. - PHYSCIAL ASSESSMENT
Unable to assess
Patient admitted with hypertensive emergency. Daughter requested low sodium diet education as patient previously not following any specific dietary restrictions. Per daughter, patient takes liquid MVI and klor-con with lasix daily at home.

## 2021-03-09 LAB
ALBUMIN SERPL ELPH-MCNC: 2.1 G/DL — LOW (ref 3.5–5)
ALP SERPL-CCNC: 103 U/L — SIGNIFICANT CHANGE UP (ref 40–120)
ALT FLD-CCNC: 30 U/L DA — SIGNIFICANT CHANGE UP (ref 10–60)
ANION GAP SERPL CALC-SCNC: 10 MMOL/L — SIGNIFICANT CHANGE UP (ref 5–17)
APTT BLD: 42.6 SEC — HIGH (ref 27.5–35.5)
AST SERPL-CCNC: 44 U/L — HIGH (ref 10–40)
BASOPHILS # BLD AUTO: 0.01 K/UL — SIGNIFICANT CHANGE UP (ref 0–0.2)
BASOPHILS NFR BLD AUTO: 0.2 % — SIGNIFICANT CHANGE UP (ref 0–2)
BILIRUB SERPL-MCNC: 0.5 MG/DL — SIGNIFICANT CHANGE UP (ref 0.2–1.2)
BUN SERPL-MCNC: 21 MG/DL — HIGH (ref 7–18)
CALCIUM SERPL-MCNC: 8.3 MG/DL — LOW (ref 8.4–10.5)
CHLORIDE SERPL-SCNC: 116 MMOL/L — HIGH (ref 96–108)
CO2 SERPL-SCNC: 22 MMOL/L — SIGNIFICANT CHANGE UP (ref 22–31)
CREAT SERPL-MCNC: 1 MG/DL — SIGNIFICANT CHANGE UP (ref 0.5–1.3)
EOSINOPHIL # BLD AUTO: 0.13 K/UL — SIGNIFICANT CHANGE UP (ref 0–0.5)
EOSINOPHIL NFR BLD AUTO: 2.6 % — SIGNIFICANT CHANGE UP (ref 0–6)
GLUCOSE SERPL-MCNC: 98 MG/DL — SIGNIFICANT CHANGE UP (ref 70–99)
HCT VFR BLD CALC: 44.6 % — SIGNIFICANT CHANGE UP (ref 34.5–45)
HGB BLD-MCNC: 14.3 G/DL — SIGNIFICANT CHANGE UP (ref 11.5–15.5)
IMM GRANULOCYTES NFR BLD AUTO: 1.2 % — SIGNIFICANT CHANGE UP (ref 0–1.5)
INR BLD: 3.64 RATIO — HIGH (ref 0.88–1.16)
LYMPHOCYTES # BLD AUTO: 1.41 K/UL — SIGNIFICANT CHANGE UP (ref 1–3.3)
LYMPHOCYTES # BLD AUTO: 28.4 % — SIGNIFICANT CHANGE UP (ref 13–44)
MCHC RBC-ENTMCNC: 27.6 PG — SIGNIFICANT CHANGE UP (ref 27–34)
MCHC RBC-ENTMCNC: 32.1 GM/DL — SIGNIFICANT CHANGE UP (ref 32–36)
MCV RBC AUTO: 85.9 FL — SIGNIFICANT CHANGE UP (ref 80–100)
MONOCYTES # BLD AUTO: 0.52 K/UL — SIGNIFICANT CHANGE UP (ref 0–0.9)
MONOCYTES NFR BLD AUTO: 10.5 % — SIGNIFICANT CHANGE UP (ref 2–14)
NEUTROPHILS # BLD AUTO: 2.84 K/UL — SIGNIFICANT CHANGE UP (ref 1.8–7.4)
NEUTROPHILS NFR BLD AUTO: 57.1 % — SIGNIFICANT CHANGE UP (ref 43–77)
NRBC # BLD: 0 /100 WBCS — SIGNIFICANT CHANGE UP (ref 0–0)
PLATELET # BLD AUTO: 209 K/UL — SIGNIFICANT CHANGE UP (ref 150–400)
POTASSIUM SERPL-MCNC: 4.2 MMOL/L — SIGNIFICANT CHANGE UP (ref 3.5–5.3)
POTASSIUM SERPL-SCNC: 4.2 MMOL/L — SIGNIFICANT CHANGE UP (ref 3.5–5.3)
PROT SERPL-MCNC: 5.5 G/DL — LOW (ref 6–8.3)
PROTHROM AB SERPL-ACNC: 40.7 SEC — HIGH (ref 10.6–13.6)
RBC # BLD: 5.19 M/UL — SIGNIFICANT CHANGE UP (ref 3.8–5.2)
RBC # FLD: 13.5 % — SIGNIFICANT CHANGE UP (ref 10.3–14.5)
SODIUM SERPL-SCNC: 148 MMOL/L — HIGH (ref 135–145)
WBC # BLD: 4.97 K/UL — SIGNIFICANT CHANGE UP (ref 3.8–10.5)
WBC # FLD AUTO: 4.97 K/UL — SIGNIFICANT CHANGE UP (ref 3.8–10.5)

## 2021-03-09 PROCEDURE — 99232 SBSQ HOSP IP/OBS MODERATE 35: CPT

## 2021-03-09 RX ORDER — CHLORHEXIDINE GLUCONATE 213 G/1000ML
1 SOLUTION TOPICAL
Refills: 0 | Status: DISCONTINUED | OUTPATIENT
Start: 2021-03-09 | End: 2021-03-18

## 2021-03-09 RX ORDER — MUPIROCIN 20 MG/G
1 OINTMENT TOPICAL
Refills: 0 | Status: COMPLETED | OUTPATIENT
Start: 2021-03-09 | End: 2021-03-14

## 2021-03-09 RX ADMIN — MUPIROCIN 1 APPLICATION(S): 20 OINTMENT TOPICAL at 17:19

## 2021-03-09 RX ADMIN — AMLODIPINE BESYLATE 2.5 MILLIGRAM(S): 2.5 TABLET ORAL at 05:36

## 2021-03-09 RX ADMIN — CHLORHEXIDINE GLUCONATE 1 APPLICATION(S): 213 SOLUTION TOPICAL at 17:20

## 2021-03-09 RX ADMIN — DULOXETINE HYDROCHLORIDE 60 MILLIGRAM(S): 30 CAPSULE, DELAYED RELEASE ORAL at 08:38

## 2021-03-09 RX ADMIN — PANTOPRAZOLE SODIUM 40 MILLIGRAM(S): 20 TABLET, DELAYED RELEASE ORAL at 06:23

## 2021-03-09 RX ADMIN — DONEPEZIL HYDROCHLORIDE 10 MILLIGRAM(S): 10 TABLET, FILM COATED ORAL at 21:03

## 2021-03-09 RX ADMIN — Medication 81 MILLIGRAM(S): at 08:38

## 2021-03-09 RX ADMIN — MIRTAZAPINE 15 MILLIGRAM(S): 45 TABLET, ORALLY DISINTEGRATING ORAL at 21:03

## 2021-03-09 NOTE — PROGRESS NOTE ADULT - SUBJECTIVE AND OBJECTIVE BOX
-8-8-8- INCOMPLETE NP Note discussed with  Primary Attending    Patient is a 88y old  Female who presents with a chief complaint of lethargy (09 Mar 2021 15:21)    Patient is a 88y old  Female who presents with a chief complaint of lethargy (04 Mar 2021 17:27)  HPI:  89 yo female from home with medical history significant for HTN, CHF, Afib on Coumadin, Dementia, PPM comes in with lethargy and poor oral intake. Patient admitted for AMS, found to be COVID + chest xray with B/L infiltrates. INR now within theraputic range, will keep dose at 2mg. Physical therapy consult recommends JACK , Will need negative COVID prior to D.C. Can be retested on 3/12         INTERVAL HPI/OVERNIGHT EVENTS: no new complaints    MEDICATIONS  (STANDING):  amLODIPine   Tablet 2.5 milliGRAM(s) Oral daily  aspirin enteric coated 81 milliGRAM(s) Oral daily  chlorhexidine 2% Cloths 1 Application(s) Topical <User Schedule>  donepezil 10 milliGRAM(s) Oral at bedtime  DULoxetine 60 milliGRAM(s) Oral daily  mirtazapine 15 milliGRAM(s) Oral at bedtime  mupirocin 2% Ointment 1 Application(s) Topical two times a day  pantoprazole    Tablet 40 milliGRAM(s) Oral before breakfast    MEDICATIONS  (PRN):      __________________________________________________  REVIEW OF SYSTEMS:    CONSTITUTIONAL: No fever,   EYES: no acute visual disturbances  NECK: No pain or stiffness  RESPIRATORY: No cough; No shortness of breath  CARDIOVASCULAR: No chest pain, no palpitations  GASTROINTESTINAL: No pain. No nausea or vomiting; No diarrhea   NEUROLOGICAL: No headache or numbness, no tremors  MUSCULOSKELETAL: No joint pain, no muscle pain  GENITOURINARY: no dysuria, no frequency, no hesitancy  PSYCHIATRY: no depression , no anxiety  ALL OTHER  ROS negative        Vital Signs Last 24 Hrs  T(C): 36.6 (09 Mar 2021 12:13), Max: 36.6 (09 Mar 2021 12:13)  T(F): 97.9 (09 Mar 2021 12:13), Max: 97.9 (09 Mar 2021 12:13)  HR: 60 (09 Mar 2021 12:13) (60 - 62)  BP: 100/60 (09 Mar 2021 12:13) (100/60 - 145/86)  BP(mean): --  RR: 15 (09 Mar 2021 12:13) (15 - 16)  SpO2: 94% (09 Mar 2021 12:13) (94% - 98%)    ________________________________________________  PHYSICAL EXAM:  GENERAL: NAD  HEENT: Normocephalic;  conjunctivae and sclerae clear; moist mucous membranes;   NECK : supple  CHEST/LUNG: Clear to auscultation bilaterally with good air entry   HEART: S1 S2  regular; no murmurs, gallops or rubs  ABDOMEN: Soft, Nontender, Nondistended; Bowel sounds present  EXTREMITIES: no cyanosis; no edema; no calf tenderness  SKIN: warm and dry; no rash  NERVOUS SYSTEM:  Awake and alert; Oriented  to place, person and time ; no new deficits    _________________________________________________  LABS:                        14.3   4.97  )-----------( 209      ( 09 Mar 2021 07:02 )             44.6     03-09    148<H>  |  116<H>  |  21<H>  ----------------------------<  98  4.2   |  22  |  1.00    Ca    8.3<L>      09 Mar 2021 07:02    TPro  5.5<L>  /  Alb  2.1<L>  /  TBili  0.5  /  DBili  x   /  AST  44<H>  /  ALT  30  /  AlkPhos  103  03-09    PT/INR - ( 09 Mar 2021 07:02 )   PT: 40.7 sec;   INR: 3.64 ratio         PTT - ( 09 Mar 2021 07:02 )  PTT:42.6 sec    CAPILLARY BLOOD GLUCOSE            RADIOLOGY & ADDITIONAL TESTS:    Imaging Personally Reviewed:  YES/NO    Consultant(s) Notes Reviewed:   YES/ No    Care Discussed with Consultants :     Plan of care was discussed with patient and /or primary care giver; all questions and concerns were addressed and care was aligned with patient's wishes.     NP Note discussed with  Primary Attending    Patient is a 88y old  Female who presents with a chief complaint of lethargy (09 Mar 2021 15:21)    HPI:  87 yo female from home with medical history significant for HTN, CHF, Afib on Coumadin, Dementia, PPM comes in with lethargy and poor oral intake. Patient admitted for AMS, found to be COVID + chest xray with B/L infiltrates. Physical therapy consult recommends JACK , Will need negative COVID prior to D.C. Can be retested on 3/12. Holding Coumadin 3/9 for supratherapeutic INR, will re-eval INR in AM     INTERVAL HPI/OVERNIGHT EVENTS: no new complaints    MEDICATIONS  (STANDING):  amLODIPine   Tablet 2.5 milliGRAM(s) Oral daily  aspirin enteric coated 81 milliGRAM(s) Oral daily  chlorhexidine 2% Cloths 1 Application(s) Topical <User Schedule>  donepezil 10 milliGRAM(s) Oral at bedtime  DULoxetine 60 milliGRAM(s) Oral daily  mirtazapine 15 milliGRAM(s) Oral at bedtime  mupirocin 2% Ointment 1 Application(s) Topical two times a day  pantoprazole    Tablet 40 milliGRAM(s) Oral before breakfast    MEDICATIONS  (PRN):      __________________________________________________  REVIEW OF SYSTEMS:  Unable to participate due to mental status       Vital Signs Last 24 Hrs  T(C): 36.6 (09 Mar 2021 12:13), Max: 36.6 (09 Mar 2021 12:13)  T(F): 97.9 (09 Mar 2021 12:13), Max: 97.9 (09 Mar 2021 12:13)  HR: 60 (09 Mar 2021 12:13) (60 - 62)  BP: 100/60 (09 Mar 2021 12:13) (100/60 - 145/86)  BP(mean): --  RR: 15 (09 Mar 2021 12:13) (15 - 16)  SpO2: 94% (09 Mar 2021 12:13) (94% - 98%)    ________________________________________________  PHYSICAL EXAM:  GENERAL: NAD  HEENT: Normocephalic;  conjunctivae and sclerae clear  NECK : supple  CHEST/LUNG: Clear to auscultation bilaterally , diminished to bases   HEART: S1 S2  regular; no murmurs, gallops or rubs  ABDOMEN: Soft, Nontender, Nondistended; Bowel sounds present  EXTREMITIES: no cyanosis; no edema; no calf tenderness  SKIN: warm and dry; no rash  NERVOUS SYSTEM:  Open eyes to voice, A&OX1     _________________________________________________  LABS:                        14.3   4.97  )-----------( 209      ( 09 Mar 2021 07:02 )             44.6     03-09    148<H>  |  116<H>  |  21<H>  ----------------------------<  98  4.2   |  22  |  1.00    Ca    8.3<L>      09 Mar 2021 07:02    TPro  5.5<L>  /  Alb  2.1<L>  /  TBili  0.5  /  DBili  x   /  AST  44<H>  /  ALT  30  /  AlkPhos  103  03-09    PT/INR - ( 09 Mar 2021 07:02 )   PT: 40.7 sec;   INR: 3.64 ratio         PTT - ( 09 Mar 2021 07:02 )  PTT:42.6 sec      Plan of care was discussed with patient and /or primary care giver; all questions and concerns were addressed and care was aligned with patient's wishes.

## 2021-03-10 DIAGNOSIS — R79.1 ABNORMAL COAGULATION PROFILE: ICD-10-CM

## 2021-03-10 LAB
ANION GAP SERPL CALC-SCNC: 8 MMOL/L — SIGNIFICANT CHANGE UP (ref 5–17)
BUN SERPL-MCNC: 22 MG/DL — HIGH (ref 7–18)
CALCIUM SERPL-MCNC: 8.4 MG/DL — SIGNIFICANT CHANGE UP (ref 8.4–10.5)
CHLORIDE SERPL-SCNC: 114 MMOL/L — HIGH (ref 96–108)
CO2 SERPL-SCNC: 24 MMOL/L — SIGNIFICANT CHANGE UP (ref 22–31)
CREAT SERPL-MCNC: 1.09 MG/DL — SIGNIFICANT CHANGE UP (ref 0.5–1.3)
GLUCOSE SERPL-MCNC: 98 MG/DL — SIGNIFICANT CHANGE UP (ref 70–99)
HCT VFR BLD CALC: 43.6 % — SIGNIFICANT CHANGE UP (ref 34.5–45)
HGB BLD-MCNC: 14.2 G/DL — SIGNIFICANT CHANGE UP (ref 11.5–15.5)
INR BLD: 3.57 RATIO — HIGH (ref 0.88–1.16)
MCHC RBC-ENTMCNC: 28.1 PG — SIGNIFICANT CHANGE UP (ref 27–34)
MCHC RBC-ENTMCNC: 32.6 GM/DL — SIGNIFICANT CHANGE UP (ref 32–36)
MCV RBC AUTO: 86.2 FL — SIGNIFICANT CHANGE UP (ref 80–100)
NRBC # BLD: 0 /100 WBCS — SIGNIFICANT CHANGE UP (ref 0–0)
PLATELET # BLD AUTO: 244 K/UL — SIGNIFICANT CHANGE UP (ref 150–400)
POTASSIUM SERPL-MCNC: 3.8 MMOL/L — SIGNIFICANT CHANGE UP (ref 3.5–5.3)
POTASSIUM SERPL-SCNC: 3.8 MMOL/L — SIGNIFICANT CHANGE UP (ref 3.5–5.3)
PROTHROM AB SERPL-ACNC: 40 SEC — HIGH (ref 10.6–13.6)
RBC # BLD: 5.06 M/UL — SIGNIFICANT CHANGE UP (ref 3.8–5.2)
RBC # FLD: 13.5 % — SIGNIFICANT CHANGE UP (ref 10.3–14.5)
SARS-COV-2 RNA SPEC QL NAA+PROBE: DETECTED
SODIUM SERPL-SCNC: 146 MMOL/L — HIGH (ref 135–145)
WBC # BLD: 5.54 K/UL — SIGNIFICANT CHANGE UP (ref 3.8–10.5)
WBC # FLD AUTO: 5.54 K/UL — SIGNIFICANT CHANGE UP (ref 3.8–10.5)

## 2021-03-10 PROCEDURE — 99232 SBSQ HOSP IP/OBS MODERATE 35: CPT

## 2021-03-10 RX ADMIN — MUPIROCIN 1 APPLICATION(S): 20 OINTMENT TOPICAL at 17:23

## 2021-03-10 RX ADMIN — CHLORHEXIDINE GLUCONATE 1 APPLICATION(S): 213 SOLUTION TOPICAL at 05:52

## 2021-03-10 RX ADMIN — DONEPEZIL HYDROCHLORIDE 10 MILLIGRAM(S): 10 TABLET, FILM COATED ORAL at 21:33

## 2021-03-10 RX ADMIN — Medication 81 MILLIGRAM(S): at 12:34

## 2021-03-10 RX ADMIN — PANTOPRAZOLE SODIUM 40 MILLIGRAM(S): 20 TABLET, DELAYED RELEASE ORAL at 06:36

## 2021-03-10 RX ADMIN — DULOXETINE HYDROCHLORIDE 60 MILLIGRAM(S): 30 CAPSULE, DELAYED RELEASE ORAL at 12:34

## 2021-03-10 RX ADMIN — MUPIROCIN 1 APPLICATION(S): 20 OINTMENT TOPICAL at 05:51

## 2021-03-10 RX ADMIN — MIRTAZAPINE 15 MILLIGRAM(S): 45 TABLET, ORALLY DISINTEGRATING ORAL at 21:33

## 2021-03-10 RX ADMIN — AMLODIPINE BESYLATE 2.5 MILLIGRAM(S): 2.5 TABLET ORAL at 05:51

## 2021-03-10 NOTE — PROGRESS NOTE ADULT - PROBLEM SELECTOR PLAN 1
SPO2 94% on room air  COVID + on 3/10  Continue with isolation precautions  Continue with supportive care

## 2021-03-10 NOTE — PROGRESS NOTE ADULT - PROBLEM SELECTOR PLAN 8
Patient from home   PT recommending JACK  Awaiting COVID negative for JACK or 10 days s/p + for d/c to [facility]

## 2021-03-10 NOTE — PROGRESS NOTE ADULT - SUBJECTIVE AND OBJECTIVE BOX
HPI:      OVERNIGHT EVENTS:      REVIEW OF SYSTEMS:      CONSTITUTIONAL: No fever  EYES: no acute visual disturbances  NECK: No pain or stiffness  RESPIRATORY: No cough; No shortness of breath  CARDIOVASCULAR: No chest pain, no palpitations  GASTROINTESTINAL: No pain. No nausea, vomiting or diarrhea   NEUROLOGICAL: No headache or numbness, no tremors  MUSCULOSKELETAL: No joint pain, no muscle pain  GENITOURINARY: no dysuria, no frequency, no hesitancy  PSYCHIATRY: no depression, no anxiety  ALL OTHER  ROS negative        Vital Signs Last 24 Hrs  T(C): 36.4 (10 Mar 2021 04:46), Max: 36.7 (09 Mar 2021 20:34)  T(F): 97.6 (10 Mar 2021 04:46), Max: 98 (09 Mar 2021 20:34)  HR: 62 (10 Mar 2021 04:46) (60 - 62)  BP: 146/79 (10 Mar 2021 04:46) (100/60 - 146/79)  BP(mean): --  RR: 16 (10 Mar 2021 04:46) (15 - 16)  SpO2: 94% (10 Mar 2021 04:46) (94% - 96%)    ________________________________________________  PHYSICAL EXAM:    GENERAL: NAD  HEENT: Normocephalic; conjunctivae and sclerae clear;  NECK : supple, no JVD  CHEST/LUNG: Clear to auscultation  HEART: S1 S2  regular  ABDOMEN: Soft, Nontender, Nondistended; Bowel sounds present  EXTREMITIES: no cyanosis; no LE edema; no calf tenderness  SKIN: warm and dry  NERVOUS SYSTEM:  Alert; no new deficits    _________________________________________________  CURRENT MEDICATIONS:    MEDICATIONS  (STANDING):  amLODIPine   Tablet 2.5 milliGRAM(s) Oral daily  aspirin enteric coated 81 milliGRAM(s) Oral daily  chlorhexidine 2% Cloths 1 Application(s) Topical <User Schedule>  donepezil 10 milliGRAM(s) Oral at bedtime  DULoxetine 60 milliGRAM(s) Oral daily  mirtazapine 15 milliGRAM(s) Oral at bedtime  mupirocin 2% Ointment 1 Application(s) Topical two times a day  pantoprazole    Tablet 40 milliGRAM(s) Oral before breakfast    MEDICATIONS  (PRN):      __________________________________________________  LABS:                          14.2   5.54  )-----------( 244      ( 10 Mar 2021 07:00 )             43.6     03-10    146<H>  |  114<H>  |  22<H>  ----------------------------<  98  3.8   |  24  |  1.09    Ca    8.4      10 Mar 2021 07:00    TPro  5.5<L>  /  Alb  2.1<L>  /  TBili  0.5  /  DBili  x   /  AST  44<H>  /  ALT  30  /  AlkPhos  103  03-09    PT/INR - ( 10 Mar 2021 07:00 )   PT: 40.0 sec;   INR: 3.57 ratio         PTT - ( 09 Mar 2021 07:02 )  PTT:42.6 sec    CAPILLARY BLOOD GLUCOSE          __________________________________________________  RADIOLOGY & ADDITIONAL TESTS:    Imaging Personally Reviewed:  YES      Consultant(s) Notes Reviewed:   YES     Plan of care was discussed with patient and /or primary care giver; all questions and concerns were addressed and care was aligned with patient's wishes.    Plan discussed with attending and consulting physicians.   HPI:  88 YOF admitted for encephalopathy likely secondary to COVID-19.  Patient awaiting COVID - for discharge to [facility].  Pt seen and examined at bedside.  Pt unable to participate in exam secondary to dementia.     OVERNIGHT EVENTS:  No new overnight events     REVIEW OF SYSTEMS:  Limited due to patients mental status     CONSTITUTIONAL: No fever     Vital Signs Last 24 Hrs  T(C): 36.4 (10 Mar 2021 04:46), Max: 36.7 (09 Mar 2021 20:34)  T(F): 97.6 (10 Mar 2021 04:46), Max: 98 (09 Mar 2021 20:34)  HR: 62 (10 Mar 2021 04:46) (60 - 62)  BP: 146/79 (10 Mar 2021 04:46) (100/60 - 146/79)  BP(mean): --  RR: 16 (10 Mar 2021 04:46) (15 - 16)  SpO2: 94% (10 Mar 2021 04:46) (94% - 96%)    ________________________________________________  PHYSICAL EXAM:    GENERAL: NAD  HEENT: Normocephalic; conjunctivae and sclerae clear;  NECK : supple, no JVD  CHEST/LUNG: Clear to auscultation  HEART: S1 S2  regular  ABDOMEN: Soft, Nontender, Nondistended; Bowel sounds present  EXTREMITIES: no cyanosis; no LE edema; no calf tenderness  SKIN: warm and dry  NERVOUS SYSTEM:  Alert; no new deficits    _________________________________________________  CURRENT MEDICATIONS:    MEDICATIONS  (STANDING):  amLODIPine   Tablet 2.5 milliGRAM(s) Oral daily  aspirin enteric coated 81 milliGRAM(s) Oral daily  chlorhexidine 2% Cloths 1 Application(s) Topical <User Schedule>  donepezil 10 milliGRAM(s) Oral at bedtime  DULoxetine 60 milliGRAM(s) Oral daily  mirtazapine 15 milliGRAM(s) Oral at bedtime  mupirocin 2% Ointment 1 Application(s) Topical two times a day  pantoprazole    Tablet 40 milliGRAM(s) Oral before breakfast    MEDICATIONS  (PRN):      __________________________________________________  LABS:                          14.2   5.54  )-----------( 244      ( 10 Mar 2021 07:00 )             43.6     03-10    146<H>  |  114<H>  |  22<H>  ----------------------------<  98  3.8   |  24  |  1.09    Ca    8.4      10 Mar 2021 07:00    TPro  5.5<L>  /  Alb  2.1<L>  /  TBili  0.5  /  DBili  x   /  AST  44<H>  /  ALT  30  /  AlkPhos  103  03-09    PT/INR - ( 10 Mar 2021 07:00 )   PT: 40.0 sec;   INR: 3.57 ratio         PTT - ( 09 Mar 2021 07:02 )  PTT:42.6 sec    CAPILLARY BLOOD GLUCOSE          __________________________________________________  RADIOLOGY & ADDITIONAL TESTS:    Imaging Personally Reviewed:  YES    < from: Xray Chest 1 View- PORTABLE-Urgent (Xray Chest 1 View- PORTABLE-Urgent .) (03.02.21 @ 19:59) >  INTERPRETATION:  AP supine chest on March 2, 2021 at 7:55 PM. Patient has altered mental status and is Covid positive.    Heart suggest enlargement. Left-sided pacemaker with spinal stimulator from below again noted.    There is some infiltration scattered in the mid lower lung fields mild in degree. This is new since August 13, 2014 and is consistent with Covid pneumonia.    IMPRESSION: Bilateralinfiltrates. Other findings stable as above.    < end of copied text >    < from: CT Head No Cont (03.02.21 @ 17:56) >  FINDINGS:    The ventricles, cisterns and sulci are prominent, consistent with generalized volume loss..   There is no acute loss of gray-white differentiation. There are mild patchy areas of hypodensity in the periventricular and subcortical white matter which are likely related to chronic microangiopathic changes. There is an empty sella.    There is no evidence of mass effect, midline shift, acute intracranial hemorrhage, or extra-axial collections.     The calvarium is intact. There is opacification of the left frontal sinus. There is a polyp versus retention cyst in the right maxillary sinus.The mastoid air cells are predominantly clear. The orbits are unremarkable.      IMPRESSION:  No acute intracranial hemorrhage or acute territorial infarction.    < end of copied text >    Consultant(s) Notes Reviewed:   YES     Plan of care was discussed with patient and /or primary care giver; all questions and concerns were addressed and care was aligned with patient's wishes.    Plan discussed with attending and consulting physicians.

## 2021-03-11 LAB
ANION GAP SERPL CALC-SCNC: 6 MMOL/L — SIGNIFICANT CHANGE UP (ref 5–17)
APTT BLD: 47.7 SEC — HIGH (ref 27.5–35.5)
BUN SERPL-MCNC: 23 MG/DL — HIGH (ref 7–18)
CALCIUM SERPL-MCNC: 8.7 MG/DL — SIGNIFICANT CHANGE UP (ref 8.4–10.5)
CHLORIDE SERPL-SCNC: 115 MMOL/L — HIGH (ref 96–108)
CO2 SERPL-SCNC: 26 MMOL/L — SIGNIFICANT CHANGE UP (ref 22–31)
CREAT SERPL-MCNC: 1.04 MG/DL — SIGNIFICANT CHANGE UP (ref 0.5–1.3)
GLUCOSE SERPL-MCNC: 98 MG/DL — SIGNIFICANT CHANGE UP (ref 70–99)
HCT VFR BLD CALC: 46.7 % — HIGH (ref 34.5–45)
HGB BLD-MCNC: 15.2 G/DL — SIGNIFICANT CHANGE UP (ref 11.5–15.5)
INR BLD: 2.48 RATIO — HIGH (ref 0.88–1.16)
MAGNESIUM SERPL-MCNC: 2.2 MG/DL — SIGNIFICANT CHANGE UP (ref 1.6–2.6)
MCHC RBC-ENTMCNC: 28.2 PG — SIGNIFICANT CHANGE UP (ref 27–34)
MCHC RBC-ENTMCNC: 32.5 GM/DL — SIGNIFICANT CHANGE UP (ref 32–36)
MCV RBC AUTO: 86.6 FL — SIGNIFICANT CHANGE UP (ref 80–100)
NRBC # BLD: 0 /100 WBCS — SIGNIFICANT CHANGE UP (ref 0–0)
PHOSPHATE SERPL-MCNC: 3 MG/DL — SIGNIFICANT CHANGE UP (ref 2.5–4.5)
PLATELET # BLD AUTO: 296 K/UL — SIGNIFICANT CHANGE UP (ref 150–400)
POTASSIUM SERPL-MCNC: 4.1 MMOL/L — SIGNIFICANT CHANGE UP (ref 3.5–5.3)
POTASSIUM SERPL-SCNC: 4.1 MMOL/L — SIGNIFICANT CHANGE UP (ref 3.5–5.3)
PROTHROM AB SERPL-ACNC: 28.3 SEC — HIGH (ref 10.6–13.6)
RBC # BLD: 5.39 M/UL — HIGH (ref 3.8–5.2)
RBC # FLD: 13.4 % — SIGNIFICANT CHANGE UP (ref 10.3–14.5)
SODIUM SERPL-SCNC: 147 MMOL/L — HIGH (ref 135–145)
WBC # BLD: 5.85 K/UL — SIGNIFICANT CHANGE UP (ref 3.8–10.5)
WBC # FLD AUTO: 5.85 K/UL — SIGNIFICANT CHANGE UP (ref 3.8–10.5)

## 2021-03-11 PROCEDURE — 99232 SBSQ HOSP IP/OBS MODERATE 35: CPT

## 2021-03-11 RX ORDER — WARFARIN SODIUM 2.5 MG/1
2 TABLET ORAL DAILY
Refills: 0 | Status: DISCONTINUED | OUTPATIENT
Start: 2021-03-11 | End: 2021-03-13

## 2021-03-11 RX ADMIN — MUPIROCIN 1 APPLICATION(S): 20 OINTMENT TOPICAL at 05:06

## 2021-03-11 RX ADMIN — DULOXETINE HYDROCHLORIDE 60 MILLIGRAM(S): 30 CAPSULE, DELAYED RELEASE ORAL at 12:11

## 2021-03-11 RX ADMIN — MUPIROCIN 1 APPLICATION(S): 20 OINTMENT TOPICAL at 18:23

## 2021-03-11 RX ADMIN — Medication 81 MILLIGRAM(S): at 12:11

## 2021-03-11 RX ADMIN — WARFARIN SODIUM 2 MILLIGRAM(S): 2.5 TABLET ORAL at 21:35

## 2021-03-11 RX ADMIN — PANTOPRAZOLE SODIUM 40 MILLIGRAM(S): 20 TABLET, DELAYED RELEASE ORAL at 05:06

## 2021-03-11 RX ADMIN — DONEPEZIL HYDROCHLORIDE 10 MILLIGRAM(S): 10 TABLET, FILM COATED ORAL at 21:35

## 2021-03-11 RX ADMIN — MIRTAZAPINE 15 MILLIGRAM(S): 45 TABLET, ORALLY DISINTEGRATING ORAL at 21:35

## 2021-03-11 RX ADMIN — CHLORHEXIDINE GLUCONATE 1 APPLICATION(S): 213 SOLUTION TOPICAL at 05:08

## 2021-03-11 NOTE — PROGRESS NOTE ADULT - SUBJECTIVE AND OBJECTIVE BOX
HPI:  88 YOF admitted for encephalopathy likely secondary to COVID-19.  Patient awaiting COVID - for discharge to [facility].  Pt seen and examined at bedside.  Pt unable to participate in exam secondary to dementia.     OVERNIGHT EVENTS:  No new overnight events     REVIEW OF SYSTEMS:  Limited due to patients mental status     CONSTITUTIONAL: No fever   Vital Signs Last 24 Hrs  T(C): 36.7 (11 Mar 2021 13:27), Max: 36.7 (11 Mar 2021 13:27)  T(F): 98 (11 Mar 2021 13:27), Max: 98 (11 Mar 2021 13:27)  HR: 61 (11 Mar 2021 13:27) (58 - 62)  BP: 150/60 (11 Mar 2021 13:27) (103/79 - 150/60)  BP(mean): --  RR: 17 (11 Mar 2021 13:27) (16 - 17)  SpO2: 99% (11 Mar 2021 13:27) (96% - 99%)    ________________________________________________  PHYSICAL EXAM:    GENERAL: NAD  HEENT: Normocephalic; conjunctivae and sclerae clear;  NECK : supple, no JVD  CHEST/LUNG: Clear to auscultation  HEART: S1 S2  regular  ABDOMEN: Soft, Nontender, Nondistended; Bowel sounds present  EXTREMITIES: no cyanosis; no LE edema; no calf tenderness  SKIN: warm and dry  NERVOUS SYSTEM:  No new deficits    _________________________________________________  CURRENT MEDICATIONS:    MEDICATIONS  (STANDING):  amLODIPine   Tablet 2.5 milliGRAM(s) Oral daily  aspirin enteric coated 81 milliGRAM(s) Oral daily  chlorhexidine 2% Cloths 1 Application(s) Topical <User Schedule>  donepezil 10 milliGRAM(s) Oral at bedtime  DULoxetine 60 milliGRAM(s) Oral daily  mirtazapine 15 milliGRAM(s) Oral at bedtime  mupirocin 2% Ointment 1 Application(s) Topical two times a day  pantoprazole    Tablet 40 milliGRAM(s) Oral before breakfast  warfarin 2 milliGRAM(s) Oral daily    MEDICATIONS  (PRN):      __________________________________________________  LABS:                          15.2   5.85  )-----------( 296      ( 11 Mar 2021 05:59 )             46.7     03-11    147<H>  |  115<H>  |  23<H>  ----------------------------<  98  4.1   |  26  |  1.04    Ca    8.7      11 Mar 2021 05:59  Phos  3.0     03-11  Mg     2.2     03-11      PT/INR - ( 11 Mar 2021 05:59 )   PT: 28.3 sec;   INR: 2.48 ratio         PTT - ( 11 Mar 2021 05:59 )  PTT:47.7 sec    CAPILLARY BLOOD GLUCOSE          __________________________________________________  RADIOLOGY & ADDITIONAL TESTS:    Imaging Personally Reviewed:  YES    < from: Xray Chest 1 View- PORTABLE-Urgent (Xray Chest 1 View- PORTABLE-Urgent .) (03.02.21 @ 19:59) >  Heart suggest enlargement. Left-sided pacemaker with spinal stimulator from below again noted.    There is some infiltration scattered in the mid lower lung fields mild in degree. This is new since August 13, 2014 and is consistent with Covid pneumonia.    IMPRESSION: Bilateralinfiltrates. Other findings stable as above.    < end of copied text >    Consultant(s) Notes Reviewed:   YES     Plan of care was discussed with patient and /or primary care giver; all questions and concerns were addressed and care was aligned with patient's wishes.    Plan discussed with attending and consulting physicians.

## 2021-03-11 NOTE — PROGRESS NOTE ADULT - PROBLEM SELECTOR PLAN 1
SPO2 99% on room air  COVID + on 3/10  Continue with isolation precautions  Continue with supportive care SPO2 99% on room air  COVID + on 3/10  Follow up repeat PCR 3/12  Continue with isolation precautions  Continue with supportive care

## 2021-03-12 DIAGNOSIS — R79.1 ABNORMAL COAGULATION PROFILE: ICD-10-CM

## 2021-03-12 LAB
ANION GAP SERPL CALC-SCNC: 3 MMOL/L — LOW (ref 5–17)
APTT BLD: 45.1 SEC — HIGH (ref 27.5–35.5)
BUN SERPL-MCNC: 19 MG/DL — HIGH (ref 7–18)
CALCIUM SERPL-MCNC: 8.4 MG/DL — SIGNIFICANT CHANGE UP (ref 8.4–10.5)
CHLORIDE SERPL-SCNC: 114 MMOL/L — HIGH (ref 96–108)
CO2 SERPL-SCNC: 27 MMOL/L — SIGNIFICANT CHANGE UP (ref 22–31)
CREAT SERPL-MCNC: 1.11 MG/DL — SIGNIFICANT CHANGE UP (ref 0.5–1.3)
GLUCOSE SERPL-MCNC: 96 MG/DL — SIGNIFICANT CHANGE UP (ref 70–99)
HCT VFR BLD CALC: 43.7 % — SIGNIFICANT CHANGE UP (ref 34.5–45)
HGB BLD-MCNC: 14.2 G/DL — SIGNIFICANT CHANGE UP (ref 11.5–15.5)
INR BLD: 2.67 RATIO — HIGH (ref 0.88–1.16)
MAGNESIUM SERPL-MCNC: 2.3 MG/DL — SIGNIFICANT CHANGE UP (ref 1.6–2.6)
MCHC RBC-ENTMCNC: 28.2 PG — SIGNIFICANT CHANGE UP (ref 27–34)
MCHC RBC-ENTMCNC: 32.5 GM/DL — SIGNIFICANT CHANGE UP (ref 32–36)
MCV RBC AUTO: 86.7 FL — SIGNIFICANT CHANGE UP (ref 80–100)
NRBC # BLD: 0 /100 WBCS — SIGNIFICANT CHANGE UP (ref 0–0)
PHOSPHATE SERPL-MCNC: 3.2 MG/DL — SIGNIFICANT CHANGE UP (ref 2.5–4.5)
PLATELET # BLD AUTO: 319 K/UL — SIGNIFICANT CHANGE UP (ref 150–400)
POTASSIUM SERPL-MCNC: 5.4 MMOL/L — HIGH (ref 3.5–5.3)
POTASSIUM SERPL-SCNC: 5.4 MMOL/L — HIGH (ref 3.5–5.3)
PROTHROM AB SERPL-ACNC: 30.3 SEC — HIGH (ref 10.6–13.6)
RBC # BLD: 5.04 M/UL — SIGNIFICANT CHANGE UP (ref 3.8–5.2)
RBC # FLD: 13.5 % — SIGNIFICANT CHANGE UP (ref 10.3–14.5)
SARS-COV-2 RNA SPEC QL NAA+PROBE: DETECTED
SODIUM SERPL-SCNC: 144 MMOL/L — SIGNIFICANT CHANGE UP (ref 135–145)
WBC # BLD: 6.15 K/UL — SIGNIFICANT CHANGE UP (ref 3.8–10.5)
WBC # FLD AUTO: 6.15 K/UL — SIGNIFICANT CHANGE UP (ref 3.8–10.5)

## 2021-03-12 PROCEDURE — 99232 SBSQ HOSP IP/OBS MODERATE 35: CPT

## 2021-03-12 RX ADMIN — MUPIROCIN 1 APPLICATION(S): 20 OINTMENT TOPICAL at 05:23

## 2021-03-12 RX ADMIN — Medication 81 MILLIGRAM(S): at 17:49

## 2021-03-12 RX ADMIN — DONEPEZIL HYDROCHLORIDE 10 MILLIGRAM(S): 10 TABLET, FILM COATED ORAL at 21:05

## 2021-03-12 RX ADMIN — WARFARIN SODIUM 2 MILLIGRAM(S): 2.5 TABLET ORAL at 21:05

## 2021-03-12 RX ADMIN — AMLODIPINE BESYLATE 2.5 MILLIGRAM(S): 2.5 TABLET ORAL at 05:23

## 2021-03-12 RX ADMIN — PANTOPRAZOLE SODIUM 40 MILLIGRAM(S): 20 TABLET, DELAYED RELEASE ORAL at 05:23

## 2021-03-12 RX ADMIN — CHLORHEXIDINE GLUCONATE 1 APPLICATION(S): 213 SOLUTION TOPICAL at 05:24

## 2021-03-12 RX ADMIN — DULOXETINE HYDROCHLORIDE 60 MILLIGRAM(S): 30 CAPSULE, DELAYED RELEASE ORAL at 17:48

## 2021-03-12 RX ADMIN — MUPIROCIN 1 APPLICATION(S): 20 OINTMENT TOPICAL at 17:49

## 2021-03-12 RX ADMIN — MIRTAZAPINE 15 MILLIGRAM(S): 45 TABLET, ORALLY DISINTEGRATING ORAL at 21:05

## 2021-03-12 NOTE — PROGRESS NOTE ADULT - PROBLEM SELECTOR PLAN 4
Follow up INR in AM Goal 2-3  INR 2.67  Pt takes 2 mg Coumadin at home  Continue with home dose  Follow up INR in AM

## 2021-03-12 NOTE — PROGRESS NOTE ADULT - PROBLEM SELECTOR PLAN 1
SPO2 95% on room air  COVID + on 3/12  Continue with isolation precautions  Continue with supportive care

## 2021-03-12 NOTE — PROGRESS NOTE ADULT - PROBLEM SELECTOR PLAN 8
Patient from home   PT recommending JACK  D/c to [facility] pending auth  Care management following for safe discharge planning Patient from home   PT recommending JACK  D/c to [facility] pending auth  Care management following for safe discharge planning

## 2021-03-12 NOTE — PROGRESS NOTE ADULT - NSHPATTENDINGPLANDISCUSS_GEN_ALL_CORE
NP
DAVID Ureña
Beckie NP
DAVID Ureña
DAVID Fountain
Rhiannon Valdez, NP

## 2021-03-12 NOTE — PROGRESS NOTE ADULT - SUBJECTIVE AND OBJECTIVE BOX
HPI:   88 YOF admitted for encephalopathy likely secondary to COVID-19.  D/c to [facility] pending auth.  Pt seen and examined at bedside.  Pt unable to participate in exam secondary to dementia.     OVERNIGHT EVENTS:  No new overnight events     REVIEW OF SYSTEMS:  Limited due to patients mental status     Vital Signs Last 24 Hrs  T(C): 36.6 (12 Mar 2021 12:40), Max: 36.8 (11 Mar 2021 21:45)  T(F): 97.9 (12 Mar 2021 12:40), Max: 98.2 (11 Mar 2021 21:45)  HR: 92 (12 Mar 2021 13:40) (53 - 92)  BP: 142/72 (12 Mar 2021 13:40) (104/87 - 142/72)  BP(mean): 90 (12 Mar 2021 13:40) (73 - 90)  RR: 18 (12 Mar 2021 13:40) (16 - 18)  SpO2: 97% (12 Mar 2021 13:40) (95% - 98%)    ________________________________________________  PHYSICAL EXAM:    GENERAL: NAD  HEENT: Normocephalic; conjunctivae and sclerae clear;  NECK : supple, no JVD  CHEST/LUNG: Clear to auscultation  HEART: S1 S2  regular  ABDOMEN: Soft, Nontender, Nondistended; Bowel sounds present  EXTREMITIES: no cyanosis; no LE edema; no calf tenderness  SKIN: warm and dry  NERVOUS SYSTEM:  Alert; no new deficits    _________________________________________________  CURRENT MEDICATIONS:    MEDICATIONS  (STANDING):  amLODIPine   Tablet 2.5 milliGRAM(s) Oral daily  aspirin enteric coated 81 milliGRAM(s) Oral daily  chlorhexidine 2% Cloths 1 Application(s) Topical <User Schedule>  donepezil 10 milliGRAM(s) Oral at bedtime  DULoxetine 60 milliGRAM(s) Oral daily  mirtazapine 15 milliGRAM(s) Oral at bedtime  mupirocin 2% Ointment 1 Application(s) Topical two times a day  pantoprazole    Tablet 40 milliGRAM(s) Oral before breakfast  warfarin 2 milliGRAM(s) Oral daily    MEDICATIONS  (PRN):      __________________________________________________  LABS:                          14.2   6.15  )-----------( 319      ( 12 Mar 2021 06:29 )             43.7     03-12    144  |  114<H>  |  19<H>  ----------------------------<  96  5.4<H>   |  27  |  1.11    Ca    8.4      12 Mar 2021 06:29  Phos  3.2     03-12  Mg     2.3     03-12      PT/INR - ( 11 Mar 2021 05:59 )   PT: 28.3 sec;   INR: 2.48 ratio         PTT - ( 11 Mar 2021 05:59 )  PTT:47.7 sec    CAPILLARY BLOOD GLUCOSE          __________________________________________________  RADIOLOGY & ADDITIONAL TESTS:    Imaging Personally Reviewed:  YES    < from: CT Head No Cont (03.02.21 @ 17:56) >  COMPARISON: CT head 8/14/2014    FINDINGS:    The ventricles, cisterns and sulci are prominent, consistent with generalized volume loss..   There is no acute loss of gray-white differentiation. There are mild patchy areas of hypodensity in the periventricular and subcortical white matter which are likely related to chronic microangiopathic changes. There is an empty sella.    There is no evidence of mass effect, midline shift, acute intracranial hemorrhage, or extra-axial collections.     The calvarium is intact. There is opacification of the left frontal sinus. There is a polyp versus retention cyst in the right maxillary sinus.The mastoid air cells are predominantly clear. The orbits are unremarkable.      IMPRESSION:  No acute intracranial hemorrhage or acute territorial infarction.    < end of copied text >    < from: Xray Chest 1 View- PORTABLE-Urgent (Xray Chest 1 View- PORTABLE-Urgent .) (03.02.21 @ 19:59) >  Heart suggest enlargement. Left-sided pacemaker with spinal stimulator from below again noted.    There is some infiltration scattered in the mid lower lung fields mild in degree. This is new since August 13, 2014 and is consistent with Covid pneumonia.    IMPRESSION: Bilateralinfiltrates. Other findings stable as above.    < end of copied text >    Consultant(s) Notes Reviewed:   YES     Plan of care was discussed with patient and /or primary care giver; all questions and concerns were addressed and care was aligned with patient's wishes.    Plan discussed with attending and consulting physicians.   HPI:   88 YOF admitted for encephalopathy likely secondary to COVID-19.  D/c to [facility] pending auth.  Pt seen and examined at bedside.  Pt unable to participate in exam secondary to dementia.     OVERNIGHT EVENTS:  No new overnight events     REVIEW OF SYSTEMS:  Limited due to patients mental status     Vital Signs Last 24 Hrs  T(C): 36.6 (12 Mar 2021 12:40), Max: 36.8 (11 Mar 2021 21:45)  T(F): 97.9 (12 Mar 2021 12:40), Max: 98.2 (11 Mar 2021 21:45)  HR: 92 (12 Mar 2021 13:40) (53 - 92)  BP: 142/72 (12 Mar 2021 13:40) (104/87 - 142/72)  BP(mean): 90 (12 Mar 2021 13:40) (73 - 90)  RR: 18 (12 Mar 2021 13:40) (16 - 18)  SpO2: 97% (12 Mar 2021 13:40) (95% - 98%)    ________________________________________________  PHYSICAL EXAM:    GENERAL: NAD  HEENT: Normocephalic; conjunctivae and sclerae clear;  NECK : supple, no JVD  CHEST/LUNG: Clear to auscultation  HEART: S1 S2  regular  ABDOMEN: Soft, Nontender, Nondistended; Bowel sounds present  EXTREMITIES: no cyanosis; no LE edema; no calf tenderness  SKIN: warm and dry  NERVOUS SYSTEM:  Alert; no new deficits    _________________________________________________  CURRENT MEDICATIONS:    MEDICATIONS  (STANDING):  amLODIPine   Tablet 2.5 milliGRAM(s) Oral daily  aspirin enteric coated 81 milliGRAM(s) Oral daily  chlorhexidine 2% Cloths 1 Application(s) Topical <User Schedule>  donepezil 10 milliGRAM(s) Oral at bedtime  DULoxetine 60 milliGRAM(s) Oral daily  mirtazapine 15 milliGRAM(s) Oral at bedtime  mupirocin 2% Ointment 1 Application(s) Topical two times a day  pantoprazole    Tablet 40 milliGRAM(s) Oral before breakfast  warfarin 2 milliGRAM(s) Oral daily    MEDICATIONS  (PRN):      __________________________________________________  LABS:                          14.2   6.15  )-----------( 319      ( 12 Mar 2021 06:29 )             43.7     03-12    144  |  114<H>  |  19<H>  ----------------------------<  96  5.4<H>   |  27  |  1.11    Ca    8.4      12 Mar 2021 06:29  Phos  3.2     03-12  Mg     2.3     03-12      PT/INR - ( 11 Mar 2021 05:59 )   PT: 28.3 sec;   INR: 2.48 ratio         PTT - ( 11 Mar 2021 05:59 )  PTT:47.7 sec    CAPILLARY BLOOD GLUCOSE          __________________________________________________  RADIOLOGY & ADDITIONAL TESTS:    Imaging Personally Reviewed:  YES    < from: CT Head No Cont (03.02.21 @ 17:56) >  COMPARISON: CT head 8/14/2014    FINDINGS:    The ventricles, cisterns and sulci are prominent, consistent with generalized volume loss..   There is no acute loss of gray-white differentiation. There are mild patchy areas of hypodensity in the periventricular and subcortical white matter which are likely related to chronic microangiopathic changes. There is an empty sella.    There is no evidence of mass effect, midline shift, acute intracranial hemorrhage, or extra-axial collections.     The calvarium is intact. There is opacification of the left frontal sinus. There is a polyp versus retention cyst in the right maxillary sinus.The mastoid air cells are predominantly clear. The orbits are unremarkable.      IMPRESSION:  No acute intracranial hemorrhage or acute territorial infarction.    < end of copied text >    < from: Xray Chest 1 View- PORTABLE-Urgent (Xray Chest 1 View- PORTABLE-Urgent .) (03.02.21 @ 19:59) >  Heart suggest enlargement. Left-sided pacemaker with spinal stimulator from below again noted.    There is some infiltration scattered in the mid lower lung fields mild in degree. This is new since August 13, 2014 and is consistent with Covid pneumonia.    IMPRESSION: Bilateralinfiltrates. Other findings stable as above.    < end of copied text >    Consultant(s) Notes Reviewed:   YES     Plan of care was discussed with patient and /or primary care giver; all questions and concerns were addressed and care was aligned with patient's wishes.    Plan discussed with attending and consulting physicians.

## 2021-03-13 LAB
ANION GAP SERPL CALC-SCNC: 5 MMOL/L — SIGNIFICANT CHANGE UP (ref 5–17)
APTT BLD: 46.5 SEC — HIGH (ref 27.5–35.5)
BUN SERPL-MCNC: 18 MG/DL — SIGNIFICANT CHANGE UP (ref 7–18)
CALCIUM SERPL-MCNC: 8.3 MG/DL — LOW (ref 8.4–10.5)
CHLORIDE SERPL-SCNC: 117 MMOL/L — HIGH (ref 96–108)
CO2 SERPL-SCNC: 25 MMOL/L — SIGNIFICANT CHANGE UP (ref 22–31)
CREAT SERPL-MCNC: 1.03 MG/DL — SIGNIFICANT CHANGE UP (ref 0.5–1.3)
GLUCOSE SERPL-MCNC: 95 MG/DL — SIGNIFICANT CHANGE UP (ref 70–99)
HCT VFR BLD CALC: 42.2 % — SIGNIFICANT CHANGE UP (ref 34.5–45)
HGB BLD-MCNC: 14 G/DL — SIGNIFICANT CHANGE UP (ref 11.5–15.5)
INR BLD: 3.06 RATIO — HIGH (ref 0.88–1.16)
MAGNESIUM SERPL-MCNC: 2 MG/DL — SIGNIFICANT CHANGE UP (ref 1.6–2.6)
MCHC RBC-ENTMCNC: 28.5 PG — SIGNIFICANT CHANGE UP (ref 27–34)
MCHC RBC-ENTMCNC: 33.2 GM/DL — SIGNIFICANT CHANGE UP (ref 32–36)
MCV RBC AUTO: 85.9 FL — SIGNIFICANT CHANGE UP (ref 80–100)
NRBC # BLD: 0 /100 WBCS — SIGNIFICANT CHANGE UP (ref 0–0)
PHOSPHATE SERPL-MCNC: 3 MG/DL — SIGNIFICANT CHANGE UP (ref 2.5–4.5)
PLATELET # BLD AUTO: 309 K/UL — SIGNIFICANT CHANGE UP (ref 150–400)
POTASSIUM SERPL-MCNC: 4.4 MMOL/L — SIGNIFICANT CHANGE UP (ref 3.5–5.3)
POTASSIUM SERPL-SCNC: 4.4 MMOL/L — SIGNIFICANT CHANGE UP (ref 3.5–5.3)
PROTHROM AB SERPL-ACNC: 34.5 SEC — HIGH (ref 10.6–13.6)
RBC # BLD: 4.91 M/UL — SIGNIFICANT CHANGE UP (ref 3.8–5.2)
RBC # FLD: 13.3 % — SIGNIFICANT CHANGE UP (ref 10.3–14.5)
SODIUM SERPL-SCNC: 147 MMOL/L — HIGH (ref 135–145)
WBC # BLD: 7.07 K/UL — SIGNIFICANT CHANGE UP (ref 3.8–10.5)
WBC # FLD AUTO: 7.07 K/UL — SIGNIFICANT CHANGE UP (ref 3.8–10.5)

## 2021-03-13 PROCEDURE — 99232 SBSQ HOSP IP/OBS MODERATE 35: CPT

## 2021-03-13 RX ORDER — WARFARIN SODIUM 2.5 MG/1
1 TABLET ORAL ONCE
Refills: 0 | Status: COMPLETED | OUTPATIENT
Start: 2021-03-13 | End: 2021-03-13

## 2021-03-13 RX ORDER — SODIUM CHLORIDE 9 MG/ML
1000 INJECTION, SOLUTION INTRAVENOUS
Refills: 0 | Status: DISCONTINUED | OUTPATIENT
Start: 2021-03-13 | End: 2021-03-18

## 2021-03-13 RX ADMIN — PANTOPRAZOLE SODIUM 40 MILLIGRAM(S): 20 TABLET, DELAYED RELEASE ORAL at 06:40

## 2021-03-13 RX ADMIN — DONEPEZIL HYDROCHLORIDE 10 MILLIGRAM(S): 10 TABLET, FILM COATED ORAL at 22:00

## 2021-03-13 RX ADMIN — MUPIROCIN 1 APPLICATION(S): 20 OINTMENT TOPICAL at 17:49

## 2021-03-13 RX ADMIN — AMLODIPINE BESYLATE 2.5 MILLIGRAM(S): 2.5 TABLET ORAL at 05:34

## 2021-03-13 RX ADMIN — Medication 81 MILLIGRAM(S): at 12:37

## 2021-03-13 RX ADMIN — CHLORHEXIDINE GLUCONATE 1 APPLICATION(S): 213 SOLUTION TOPICAL at 05:34

## 2021-03-13 RX ADMIN — SODIUM CHLORIDE 60 MILLILITER(S): 9 INJECTION, SOLUTION INTRAVENOUS at 17:50

## 2021-03-13 RX ADMIN — MIRTAZAPINE 15 MILLIGRAM(S): 45 TABLET, ORALLY DISINTEGRATING ORAL at 21:58

## 2021-03-13 RX ADMIN — DULOXETINE HYDROCHLORIDE 60 MILLIGRAM(S): 30 CAPSULE, DELAYED RELEASE ORAL at 12:37

## 2021-03-13 RX ADMIN — WARFARIN SODIUM 1 MILLIGRAM(S): 2.5 TABLET ORAL at 22:00

## 2021-03-13 RX ADMIN — MUPIROCIN 1 APPLICATION(S): 20 OINTMENT TOPICAL at 05:34

## 2021-03-13 NOTE — PROGRESS NOTE ADULT - SUBJECTIVE AND OBJECTIVE BOX
HPI:  87 yo female with medical history significant for HTN, CHF, Afib on Coumadin, Dementia, PPM comes in with lethargy and poor oral intake. Patient's daughter reports patient has been declining for the past one week. At baseline, walks with a walker. Is confused but oriented to family members. For past 5 days, has been sleeping all day, not getting out of bed. When daughter tries to stand her up or move her to a chair, patient's legs buckle. Seems disoriented when daughter wakes her.  Pt didn't have any respiratory symptoms. Patients grandson who lives with the pt also tested positive for COVID. Pt has hx of chf on lasix 40 QD, pacemaker on warfarin 2 mg QD. Pt hasn't taken any home meds today.  (02 Mar 2021 20:11)      Patient is a 88y old  Female who presents with a chief complaint of lethargy (12 Mar 2021 17:41)      INTERVAL HPI/OVERNIGHT EVENTS:  T(C): 36.4 (03-13-21 @ 12:57), Max: 37.1 (03-12-21 @ 20:30)  HR: 60 (03-13-21 @ 12:57) (60 - 86)  BP: 131/65 (03-13-21 @ 12:57) (124/72 - 149/58)  RR: 15 (03-13-21 @ 12:57) (15 - 16)  SpO2: 98% (03-13-21 @ 12:57) (98% - 98%)  Wt(kg): --  I&O's Summary      REVIEW OF SYSTEMS: denies fever, chills, SOB, palpitations, chest pain, abdominal pain, nausea, vomitting, diarrhea, constipation, dizziness    MEDICATIONS  (STANDING):  amLODIPine   Tablet 2.5 milliGRAM(s) Oral daily  aspirin enteric coated 81 milliGRAM(s) Oral daily  chlorhexidine 2% Cloths 1 Application(s) Topical <User Schedule>  donepezil 10 milliGRAM(s) Oral at bedtime  DULoxetine 60 milliGRAM(s) Oral daily  mirtazapine 15 milliGRAM(s) Oral at bedtime  mupirocin 2% Ointment 1 Application(s) Topical two times a day  pantoprazole    Tablet 40 milliGRAM(s) Oral before breakfast  warfarin 2 milliGRAM(s) Oral daily    MEDICATIONS  (PRN):      PHYSICAL EXAM:  GENERAL: NAD, well-groomed, well-developed  HEAD:  Atraumatic, Normocephalic  EYES: EOMI, PERRLA, conjunctiva and sclera clear  ENMT: No tonsillar erythema, exudates, or enlargement; Moist mucous membranes, Good dentition, No lesions  NECK: Supple, No JVD, Normal thyroid  NERVOUS SYSTEM:  Alert & Oriented X3, Good concentration; Motor Strength 5/5 B/L upper and lower extremities; DTRs 2+ intact and symmetric  CHEST/LUNG: Clear to percussion bilaterally; No rales, rhonchi, wheezing, or rubs  HEART: Regular rate and rhythm; No murmurs, rubs, or gallops  ABDOMEN: Soft, Nontender, Nondistended; Bowel sounds present  EXTREMITIES:  2+ Peripheral Pulses, No clubbing, cyanosis, or edema  LYMPH: No lymphadenopathy noted  SKIN: No rashes or lesions  LABS:                        14.0   7.07  )-----------( 309      ( 13 Mar 2021 08:47 )             42.2     03-13    147<H>  |  117<H>  |  18  ----------------------------<  95  4.4   |  25  |  1.03    Ca    8.3<L>      13 Mar 2021 08:47  Phos  3.0     03-13  Mg     2.0     03-13      PT/INR - ( 13 Mar 2021 08:47 )   PT: 34.5 sec;   INR: 3.06 ratio         PTT - ( 13 Mar 2021 08:47 )  PTT:46.5 sec    CAPILLARY BLOOD GLUCOSE

## 2021-03-13 NOTE — PROGRESS NOTE ADULT - ASSESSMENT
89 yo female with medical history significant for HTN, CHF, Afib on Coumadin, Dementia, comes in with lethargy and poor oral intake admitted for acute encephalopathy likely related to COVID ( non severe disease).  Patient seen and examined on bedside. No new overnight issues.     Imp  Acute metabolic toxic encephalopathy likely secondary to COVID (non severe disease) mental status- improved  CHF chronic stable  Afib on Coumadin   supra therapeutic INR   Hypernatremia 2/2 dehydration  Depression      Not a candidate for Dexa or Remdesevir given non severe disease.  PT evaluation recommended JACK, will stay inhouse until the infectious period is over (first COVID PCR positive on 3/2).  Plan to d/c to [facility] ([place] 10 days of quarantine completed on 03/12.  On home dose of coumadin 2 mg od yesterday, repeat INR today 3, will give 1 mg of Coumadin today, repeat INR tomorrow am.  started on D5W @ 60 cc/hr x 1 day sodium 147, repeat BMP tomorrow.  Daughter is agreeable for DC plan to [facility]   CM on board, plan is to d/c to [facility] pending authorization.  DVT ppx  Goals of care: DNR/DNI.  89 yo female with medical history significant for HTN, CHF, Afib on Coumadin, Dementia, comes in with lethargy and poor oral intake admitted for acute encephalopathy likely related to COVID ( non severe disease).  Patient seen and examined on bedside. No new overnight issues.     Imp  Acute metabolic toxic encephalopathy likely secondary to COVID (non severe disease) mental status- improved  CHF chronic stable  Afib on Coumadin   Hypernatremia 2/2 dehydration  Depression      Not a candidate for Dexa or Remdesevir given non severe disease.  PT evaluation recommended JACK, will stay inhouse until the infectious period is over (first COVID PCR positive on 3/2).  Plan to d/c to [facility], 10 days of quarantine completed on 03/12.  On home dose of coumadin 2 mg od yesterday, repeat INR today 3, will give 1 mg of Coumadin today, repeat INR tomorrow am.  started on D5W @ 60 cc/hr x 1 day sodium 147, repeat BMP tomorrow.  Daughter is agreeable for DC plan to [facility].   CM on board, plan is to d/c to [facility] pending authorization.  DVT ppx  Goals of care: DNR/DNI.

## 2021-03-14 LAB
ANION GAP SERPL CALC-SCNC: 8 MMOL/L — SIGNIFICANT CHANGE UP (ref 5–17)
APTT BLD: 45 SEC — HIGH (ref 27.5–35.5)
BUN SERPL-MCNC: 23 MG/DL — HIGH (ref 7–18)
CALCIUM SERPL-MCNC: 8.3 MG/DL — LOW (ref 8.4–10.5)
CHLORIDE SERPL-SCNC: 111 MMOL/L — HIGH (ref 96–108)
CO2 SERPL-SCNC: 25 MMOL/L — SIGNIFICANT CHANGE UP (ref 22–31)
CREAT SERPL-MCNC: 1.25 MG/DL — SIGNIFICANT CHANGE UP (ref 0.5–1.3)
GLUCOSE SERPL-MCNC: 95 MG/DL — SIGNIFICANT CHANGE UP (ref 70–99)
HCT VFR BLD CALC: 41.1 % — SIGNIFICANT CHANGE UP (ref 34.5–45)
HGB BLD-MCNC: 13.6 G/DL — SIGNIFICANT CHANGE UP (ref 11.5–15.5)
INR BLD: 3.42 RATIO — HIGH (ref 0.88–1.16)
MAGNESIUM SERPL-MCNC: 2.1 MG/DL — SIGNIFICANT CHANGE UP (ref 1.6–2.6)
MCHC RBC-ENTMCNC: 28.6 PG — SIGNIFICANT CHANGE UP (ref 27–34)
MCHC RBC-ENTMCNC: 33.1 GM/DL — SIGNIFICANT CHANGE UP (ref 32–36)
MCV RBC AUTO: 86.3 FL — SIGNIFICANT CHANGE UP (ref 80–100)
NRBC # BLD: 0 /100 WBCS — SIGNIFICANT CHANGE UP (ref 0–0)
PHOSPHATE SERPL-MCNC: 3.2 MG/DL — SIGNIFICANT CHANGE UP (ref 2.5–4.5)
PLATELET # BLD AUTO: 339 K/UL — SIGNIFICANT CHANGE UP (ref 150–400)
POTASSIUM SERPL-MCNC: 3.8 MMOL/L — SIGNIFICANT CHANGE UP (ref 3.5–5.3)
POTASSIUM SERPL-SCNC: 3.8 MMOL/L — SIGNIFICANT CHANGE UP (ref 3.5–5.3)
PROTHROM AB SERPL-ACNC: 38.4 SEC — HIGH (ref 10.6–13.6)
RBC # BLD: 4.76 M/UL — SIGNIFICANT CHANGE UP (ref 3.8–5.2)
RBC # FLD: 13.4 % — SIGNIFICANT CHANGE UP (ref 10.3–14.5)
SODIUM SERPL-SCNC: 144 MMOL/L — SIGNIFICANT CHANGE UP (ref 135–145)
WBC # BLD: 9.53 K/UL — SIGNIFICANT CHANGE UP (ref 3.8–10.5)
WBC # FLD AUTO: 9.53 K/UL — SIGNIFICANT CHANGE UP (ref 3.8–10.5)

## 2021-03-14 PROCEDURE — 99233 SBSQ HOSP IP/OBS HIGH 50: CPT

## 2021-03-14 RX ADMIN — DONEPEZIL HYDROCHLORIDE 10 MILLIGRAM(S): 10 TABLET, FILM COATED ORAL at 21:02

## 2021-03-14 RX ADMIN — AMLODIPINE BESYLATE 2.5 MILLIGRAM(S): 2.5 TABLET ORAL at 06:34

## 2021-03-14 RX ADMIN — CHLORHEXIDINE GLUCONATE 1 APPLICATION(S): 213 SOLUTION TOPICAL at 06:34

## 2021-03-14 RX ADMIN — Medication 81 MILLIGRAM(S): at 12:34

## 2021-03-14 RX ADMIN — PANTOPRAZOLE SODIUM 40 MILLIGRAM(S): 20 TABLET, DELAYED RELEASE ORAL at 06:34

## 2021-03-14 RX ADMIN — MUPIROCIN 1 APPLICATION(S): 20 OINTMENT TOPICAL at 06:33

## 2021-03-14 RX ADMIN — MIRTAZAPINE 15 MILLIGRAM(S): 45 TABLET, ORALLY DISINTEGRATING ORAL at 21:02

## 2021-03-14 RX ADMIN — DULOXETINE HYDROCHLORIDE 60 MILLIGRAM(S): 30 CAPSULE, DELAYED RELEASE ORAL at 12:34

## 2021-03-14 NOTE — PROGRESS NOTE ADULT - ASSESSMENT
89 yo female with medical history significant for HTN, CHF, Afib on Coumadin, Dementia, comes in with lethargy and poor oral intake admitted for acute encephalopathy likely related to COVID ( non severe disease).  Patient seen and examined on bedside. No new overnight issues.     Imp  Acute metabolic toxic encephalopathy likely secondary to COVID (non severe disease) mental status- improved  CHF chronic stable  Afib on Coumadin   Hypernatremia 2/2 dehydration  Depression      Not a candidate for Dexa or Remdesevir given non severe disease.  PT evaluation recommended JACK, will stay inhouse until the infectious period is over (first COVID PCR positive on 3/2).  Plan to d/c to [facility], 10 days of quarantine completed on 03/12.  On home dose of coumadin 2 mg od yesterday, repeat INR today 3.4, will hold coumadin today dose, repeat INR tomorrow.  started on D5W @ 60 cc/hr x 1 day sodium 147> 144, repeat BMP tomorrow.  Daughter is agreeable for DC plan to [facility].   CM on board, plan is to d/c to [facility] pending authorization.  DVT ppx  Goals of care: DNR/DNI.

## 2021-03-14 NOTE — PROGRESS NOTE ADULT - SUBJECTIVE AND OBJECTIVE BOX
HPI:  89 yo female with medical history significant for HTN, CHF, Afib on Coumadin, Dementia, PPM comes in with lethargy and poor oral intake. Patient's daughter reports patient has been declining for the past one week. At baseline, walks with a walker. Is confused but oriented to family members. For past 5 days, has been sleeping all day, not getting out of bed. When daughter tries to stand her up or move her to a chair, patient's legs buckle. Seems disoriented when daughter wakes her.  Pt didn't have any respiratory symptoms. Patients grandson who lives with the pt also tested positive for COVID. Pt has hx of chf on lasix 40 QD, pacemaker on warfarin 2 mg QD. Pt hasn't taken any home meds today.  (02 Mar 2021 20:11)      Patient is a 88y old  Female who presents with a chief complaint of lethargy (13 Mar 2021 16:08)      INTERVAL HPI/OVERNIGHT EVENTS: No new overnight issue, patient having fair oral intake with assistance   T(C): 36.3 (03-14-21 @ 12:45), Max: 36.8 (03-14-21 @ 06:00)  HR: 69 (03-14-21 @ 12:45) (60 - 81)  BP: 126/58 (03-14-21 @ 12:45) (123/61 - 140/81)  RR: 14 (03-14-21 @ 12:45) (14 - 18)  SpO2: 98% (03-14-21 @ 12:45) (97% - 100%)  Wt(kg): --  I&O's Summary    13 Mar 2021 06:01  -  14 Mar 2021 07:00  --------------------------------------------------------  IN: 180 mL / OUT: 0 mL / NET: 180 mL    14 Mar 2021 07:01  -  14 Mar 2021 17:07  --------------------------------------------------------  IN: 540 mL / OUT: 0 mL / NET: 540 mL        REVIEW OF SYSTEMS: denies fever, chills, SOB, palpitations, chest pain, abdominal pain, nausea, vomitting, diarrhea, constipation, dizziness    MEDICATIONS  (STANDING):  amLODIPine   Tablet 2.5 milliGRAM(s) Oral daily  aspirin enteric coated 81 milliGRAM(s) Oral daily  chlorhexidine 2% Cloths 1 Application(s) Topical <User Schedule>  dextrose 5%. 1000 milliLiter(s) (60 mL/Hr) IV Continuous <Continuous>  donepezil 10 milliGRAM(s) Oral at bedtime  DULoxetine 60 milliGRAM(s) Oral daily  mirtazapine 15 milliGRAM(s) Oral at bedtime  pantoprazole    Tablet 40 milliGRAM(s) Oral before breakfast    MEDICATIONS  (PRN):      PHYSICAL EXAM:  GENERAL: NAD, elderly female  HEAD:  Atraumatic, Normocephalic  EYES: EOMI, PERRLA, conjunctiva and sclera clear  ENMT: No tonsillar erythema, exudates, or enlargement; Moist mucous membranes,  NECK: Supple, No JVD, Normal thyroid  NERVOUS SYSTEM:  Alert & Oriented X1, no gross neurological deficit  CHEST/LUNG: Clear to percussion bilaterally; No rales, rhonchi, wheezing, or rubs  HEART: Regular rate and rhythm; No murmurs, rubs, or gallops  ABDOMEN: Soft, Nontender, Nondistended; Bowel sounds present  EXTREMITIES:  2+ Peripheral Pulses, No clubbing, cyanosis, or edema  LYMPH: No lymphadenopathy noted  SKIN: No rashes or lesions  LABS:                        13.6   9.53  )-----------( 339      ( 14 Mar 2021 06:46 )             41.1     03-14    144  |  111<H>  |  23<H>  ----------------------------<  95  3.8   |  25  |  1.25    Ca    8.3<L>      14 Mar 2021 06:46  Phos  3.2     03-14  Mg     2.1     03-14      PT/INR - ( 14 Mar 2021 06:46 )   PT: 38.4 sec;   INR: 3.42 ratio         PTT - ( 14 Mar 2021 06:46 )  PTT:45.0 sec    CAPILLARY BLOOD GLUCOSE

## 2021-03-15 LAB
ANION GAP SERPL CALC-SCNC: 6 MMOL/L — SIGNIFICANT CHANGE UP (ref 5–17)
APTT BLD: 48.8 SEC — HIGH (ref 27.5–35.5)
BUN SERPL-MCNC: 21 MG/DL — HIGH (ref 7–18)
CALCIUM SERPL-MCNC: 8.2 MG/DL — LOW (ref 8.4–10.5)
CHLORIDE SERPL-SCNC: 113 MMOL/L — HIGH (ref 96–108)
CO2 SERPL-SCNC: 24 MMOL/L — SIGNIFICANT CHANGE UP (ref 22–31)
CREAT SERPL-MCNC: 1.03 MG/DL — SIGNIFICANT CHANGE UP (ref 0.5–1.3)
GLUCOSE SERPL-MCNC: 94 MG/DL — SIGNIFICANT CHANGE UP (ref 70–99)
HCT VFR BLD CALC: 43.5 % — SIGNIFICANT CHANGE UP (ref 34.5–45)
HGB BLD-MCNC: 14.2 G/DL — SIGNIFICANT CHANGE UP (ref 11.5–15.5)
INR BLD: 2.98 RATIO — HIGH (ref 0.88–1.16)
MAGNESIUM SERPL-MCNC: 2.2 MG/DL — SIGNIFICANT CHANGE UP (ref 1.6–2.6)
MCHC RBC-ENTMCNC: 28 PG — SIGNIFICANT CHANGE UP (ref 27–34)
MCHC RBC-ENTMCNC: 32.6 GM/DL — SIGNIFICANT CHANGE UP (ref 32–36)
MCV RBC AUTO: 85.8 FL — SIGNIFICANT CHANGE UP (ref 80–100)
NRBC # BLD: 0 /100 WBCS — SIGNIFICANT CHANGE UP (ref 0–0)
PHOSPHATE SERPL-MCNC: 3.4 MG/DL — SIGNIFICANT CHANGE UP (ref 2.5–4.5)
PLATELET # BLD AUTO: 329 K/UL — SIGNIFICANT CHANGE UP (ref 150–400)
POTASSIUM SERPL-MCNC: 4.2 MMOL/L — SIGNIFICANT CHANGE UP (ref 3.5–5.3)
POTASSIUM SERPL-SCNC: 4.2 MMOL/L — SIGNIFICANT CHANGE UP (ref 3.5–5.3)
PROTHROM AB SERPL-ACNC: 33.7 SEC — HIGH (ref 10.6–13.6)
RBC # BLD: 5.07 M/UL — SIGNIFICANT CHANGE UP (ref 3.8–5.2)
RBC # FLD: 13.3 % — SIGNIFICANT CHANGE UP (ref 10.3–14.5)
SARS-COV-2 RNA SPEC QL NAA+PROBE: DETECTED
SODIUM SERPL-SCNC: 143 MMOL/L — SIGNIFICANT CHANGE UP (ref 135–145)
WBC # BLD: 8.63 K/UL — SIGNIFICANT CHANGE UP (ref 3.8–10.5)
WBC # FLD AUTO: 8.63 K/UL — SIGNIFICANT CHANGE UP (ref 3.8–10.5)

## 2021-03-15 PROCEDURE — 99232 SBSQ HOSP IP/OBS MODERATE 35: CPT

## 2021-03-15 RX ORDER — WARFARIN SODIUM 2.5 MG/1
1 TABLET ORAL ONCE
Refills: 0 | Status: COMPLETED | OUTPATIENT
Start: 2021-03-15 | End: 2021-03-15

## 2021-03-15 RX ADMIN — Medication 81 MILLIGRAM(S): at 12:08

## 2021-03-15 RX ADMIN — CHLORHEXIDINE GLUCONATE 1 APPLICATION(S): 213 SOLUTION TOPICAL at 05:00

## 2021-03-15 RX ADMIN — DULOXETINE HYDROCHLORIDE 60 MILLIGRAM(S): 30 CAPSULE, DELAYED RELEASE ORAL at 12:08

## 2021-03-15 RX ADMIN — DONEPEZIL HYDROCHLORIDE 10 MILLIGRAM(S): 10 TABLET, FILM COATED ORAL at 21:07

## 2021-03-15 RX ADMIN — PANTOPRAZOLE SODIUM 40 MILLIGRAM(S): 20 TABLET, DELAYED RELEASE ORAL at 05:01

## 2021-03-15 RX ADMIN — WARFARIN SODIUM 1 MILLIGRAM(S): 2.5 TABLET ORAL at 21:07

## 2021-03-15 RX ADMIN — AMLODIPINE BESYLATE 2.5 MILLIGRAM(S): 2.5 TABLET ORAL at 05:01

## 2021-03-15 RX ADMIN — MIRTAZAPINE 15 MILLIGRAM(S): 45 TABLET, ORALLY DISINTEGRATING ORAL at 21:07

## 2021-03-15 NOTE — PROGRESS NOTE ADULT - PROBLEM SELECTOR PLAN 8
Patient from home   PT recommending JACK  D/c to [facility] pending auth  Covid sent today   Care management following for safe discharge planning

## 2021-03-15 NOTE — PROGRESS NOTE ADULT - SUBJECTIVE AND OBJECTIVE BOX
NP Note discussed with  Primary Attending    Patient is a 88y old  Female who presents with a chief complaint of lethargy (14 Mar 2021 17:06)      INTERVAL HPI/OVERNIGHT EVENTS: no new complaints    MEDICATIONS  (STANDING):  amLODIPine   Tablet 2.5 milliGRAM(s) Oral daily  aspirin enteric coated 81 milliGRAM(s) Oral daily  chlorhexidine 2% Cloths 1 Application(s) Topical <User Schedule>  dextrose 5%. 1000 milliLiter(s) (60 mL/Hr) IV Continuous <Continuous>  donepezil 10 milliGRAM(s) Oral at bedtime  DULoxetine 60 milliGRAM(s) Oral daily  mirtazapine 15 milliGRAM(s) Oral at bedtime  pantoprazole    Tablet 40 milliGRAM(s) Oral before breakfast    MEDICATIONS  (PRN):      __________________________________________________  REVIEW OF SYSTEMS:    CONSTITUTIONAL: No fever,   EYES: no acute visual disturbances  NECK: No pain or stiffness  RESPIRATORY: No cough; No shortness of breath  CARDIOVASCULAR: No chest pain, no palpitations  GASTROINTESTINAL: No pain. No nausea or vomiting; No diarrhea   NEUROLOGICAL: No headache or numbness, no tremors  MUSCULOSKELETAL: No joint pain, no muscle pain  GENITOURINARY: no dysuria, no frequency, no hesitancy  PSYCHIATRY: no depression , no anxiety  ALL OTHER  ROS negative        Vital Signs Last 24 Hrs  T(C): 36.4 (15 Mar 2021 13:12), Max: 36.5 (14 Mar 2021 21:05)  T(F): 97.6 (15 Mar 2021 13:12), Max: 97.7 (14 Mar 2021 21:05)  HR: 56 (15 Mar 2021 13:51) (55 - 60)  BP: 130/52 (15 Mar 2021 13:51) (117/48 - 130/52)  BP(mean): 72 (15 Mar 2021 13:51) (66 - 72)  RR: 14 (15 Mar 2021 13:12) (14 - 16)  SpO2: 98% (15 Mar 2021 13:51) (94% - 98%)    ________________________________________________  PHYSICAL EXAM:  GENERAL: NAD  HEENT: Normocephalic;  conjunctivae and sclerae clear; moist mucous membranes;   NECK : supple  CHEST/LUNG: Clear to auscultation bilaterally with good air entry   HEART: S1 S2  regular; no murmurs, gallops or rubs  ABDOMEN: Soft, Nontender, Nondistended; Bowel sounds present  EXTREMITIES: no cyanosis; no edema; no calf tenderness  SKIN: warm and dry; no rash  NERVOUS SYSTEM:  Awake and alert; Oriented  to place, person and time ; no new deficits    _________________________________________________  LABS:                        14.2   8.63  )-----------( 329      ( 15 Mar 2021 06:22 )             43.5     03-15    143  |  113<H>  |  21<H>  ----------------------------<  94  4.2   |  24  |  1.03    Ca    8.2<L>      15 Mar 2021 06:22  Phos  3.4     03-15  Mg     2.2     03-15      PT/INR - ( 15 Mar 2021 06:22 )   PT: 33.7 sec;   INR: 2.98 ratio         PTT - ( 15 Mar 2021 06:22 )  PTT:48.8 sec    CAPILLARY BLOOD GLUCOSE            RADIOLOGY & ADDITIONAL TESTS:    Imaging Personally Reviewed:  YES/NO    Consultant(s) Notes Reviewed:   YES/ No    Care Discussed with Consultants :     Plan of care was discussed with patient and /or primary care giver; all questions and concerns were addressed and care was aligned with patient's wishes.     NP Note discussed with  Primary Attending    87 yo female with medical history significant for HTN, CHF, Afib on Coumadin, Dementia, PPM comes in with lethargy and poor oral intake. Patient's daughter reports patient has been declining for the past one week. At baseline, walks with a walker. Is confused but oriented to family members. For past 5 days, has been sleeping all day, not getting out of bed. When daughter tries to stand her up or move her to a chair, patient's legs buckle. Seems disoriented when daughter wakes her.  Pt didn't have any respiratory symptoms. Patients grandson who lives with the pt also tested positive for COVID. Pt has hx of chf on lasix 40 QD, pacemaker on warfarin 2 mg QD. Pt hasn't taken any home meds today        INTERVAL HPI/OVERNIGHT EVENTS: no new complaints    MEDICATIONS  (STANDING):  amLODIPine   Tablet 2.5 milliGRAM(s) Oral daily  aspirin enteric coated 81 milliGRAM(s) Oral daily  chlorhexidine 2% Cloths 1 Application(s) Topical <User Schedule>  dextrose 5%. 1000 milliLiter(s) (60 mL/Hr) IV Continuous <Continuous>  donepezil 10 milliGRAM(s) Oral at bedtime  DULoxetine 60 milliGRAM(s) Oral daily  mirtazapine 15 milliGRAM(s) Oral at bedtime  pantoprazole    Tablet 40 milliGRAM(s) Oral before breakfast    MEDICATIONS  (PRN):      __________________________________________________  REVIEW OF SYSTEMS:    CONSTITUTIONAL: No fever,   EYES: no acute visual disturbances  NECK: No pain or stiffness  RESPIRATORY: No cough; No shortness of breath  CARDIOVASCULAR: No chest pain, no palpitations  GASTROINTESTINAL: No pain. No nausea or vomiting; No diarrhea   NEUROLOGICAL: No headache or numbness, no tremors  MUSCULOSKELETAL: No joint pain, no muscle pain  GENITOURINARY: no dysuria, no frequency, no hesitancy  PSYCHIATRY: no depression , no anxiety  ALL OTHER  ROS negative        Vital Signs Last 24 Hrs  T(C): 36.4 (15 Mar 2021 13:12), Max: 36.5 (14 Mar 2021 21:05)  T(F): 97.6 (15 Mar 2021 13:12), Max: 97.7 (14 Mar 2021 21:05)  HR: 56 (15 Mar 2021 13:51) (55 - 60)  BP: 130/52 (15 Mar 2021 13:51) (117/48 - 130/52)  BP(mean): 72 (15 Mar 2021 13:51) (66 - 72)  RR: 14 (15 Mar 2021 13:12) (14 - 16)  SpO2: 98% (15 Mar 2021 13:51) (94% - 98%)    ________________________________________________  PHYSICAL EXAM:  GENERAL: NAD  HEENT: Normocephalic;  conjunctivae and sclerae clear; moist mucous membranes;   NECK : supple  CHEST/LUNG: Clear to auscultation bilaterally with good air entry   HEART: S1 S2  regular; no murmurs, gallops or rubs  ABDOMEN: Soft, Nontender, Nondistended; Bowel sounds present  EXTREMITIES: no cyanosis; no edema; no calf tenderness  SKIN: warm and dry; no rash  NERVOUS SYSTEM:  Awake and alert; Oriented  to place, person and time ; no new deficits    _________________________________________________  LABS:                        14.2   8.63  )-----------( 329      ( 15 Mar 2021 06:22 )             43.5     03-15    143  |  113<H>  |  21<H>  ----------------------------<  94  4.2   |  24  |  1.03    Ca    8.2<L>      15 Mar 2021 06:22  Phos  3.4     03-15  Mg     2.2     03-15      PT/INR - ( 15 Mar 2021 06:22 )   PT: 33.7 sec;   INR: 2.98 ratio         PTT - ( 15 Mar 2021 06:22 )  PTT:48.8 sec    CAPILLARY BLOOD GLUCOSE            RADIOLOGY & ADDITIONAL TESTS:    Imaging Personally Reviewed:  YES/NO    Consultant(s) Notes Reviewed:   YES/ No    Care Discussed with Consultants :     Plan of care was discussed with patient and /or primary care giver; all questions and concerns were addressed and care was aligned with patient's wishes.

## 2021-03-15 NOTE — PROGRESS NOTE ADULT - PROBLEM SELECTOR PLAN 1
SPO2 98% on room air  COVID + on 3/12  Continue with isolation precautions  Continue with supportive care

## 2021-03-16 LAB
ANION GAP SERPL CALC-SCNC: 5 MMOL/L — SIGNIFICANT CHANGE UP (ref 5–17)
APTT BLD: 40.4 SEC — HIGH (ref 27.5–35.5)
BUN SERPL-MCNC: 19 MG/DL — HIGH (ref 7–18)
CALCIUM SERPL-MCNC: 8.6 MG/DL — SIGNIFICANT CHANGE UP (ref 8.4–10.5)
CHLORIDE SERPL-SCNC: 112 MMOL/L — HIGH (ref 96–108)
CO2 SERPL-SCNC: 28 MMOL/L — SIGNIFICANT CHANGE UP (ref 22–31)
CREAT SERPL-MCNC: 1.07 MG/DL — SIGNIFICANT CHANGE UP (ref 0.5–1.3)
GLUCOSE SERPL-MCNC: 100 MG/DL — HIGH (ref 70–99)
INR BLD: 2.17 RATIO — HIGH (ref 0.88–1.16)
POTASSIUM SERPL-MCNC: 4.2 MMOL/L — SIGNIFICANT CHANGE UP (ref 3.5–5.3)
POTASSIUM SERPL-SCNC: 4.2 MMOL/L — SIGNIFICANT CHANGE UP (ref 3.5–5.3)
PROTHROM AB SERPL-ACNC: 24.9 SEC — HIGH (ref 10.6–13.6)
SODIUM SERPL-SCNC: 145 MMOL/L — SIGNIFICANT CHANGE UP (ref 135–145)

## 2021-03-16 PROCEDURE — 99231 SBSQ HOSP IP/OBS SF/LOW 25: CPT

## 2021-03-16 RX ORDER — WARFARIN SODIUM 2.5 MG/1
2 TABLET ORAL ONCE
Refills: 0 | Status: COMPLETED | OUTPATIENT
Start: 2021-03-16 | End: 2021-03-16

## 2021-03-16 RX ADMIN — AMLODIPINE BESYLATE 2.5 MILLIGRAM(S): 2.5 TABLET ORAL at 05:34

## 2021-03-16 RX ADMIN — DULOXETINE HYDROCHLORIDE 60 MILLIGRAM(S): 30 CAPSULE, DELAYED RELEASE ORAL at 12:25

## 2021-03-16 RX ADMIN — CHLORHEXIDINE GLUCONATE 1 APPLICATION(S): 213 SOLUTION TOPICAL at 05:34

## 2021-03-16 RX ADMIN — WARFARIN SODIUM 2 MILLIGRAM(S): 2.5 TABLET ORAL at 21:42

## 2021-03-16 RX ADMIN — Medication 81 MILLIGRAM(S): at 12:25

## 2021-03-16 RX ADMIN — PANTOPRAZOLE SODIUM 40 MILLIGRAM(S): 20 TABLET, DELAYED RELEASE ORAL at 05:34

## 2021-03-16 RX ADMIN — MIRTAZAPINE 15 MILLIGRAM(S): 45 TABLET, ORALLY DISINTEGRATING ORAL at 21:42

## 2021-03-16 RX ADMIN — DONEPEZIL HYDROCHLORIDE 10 MILLIGRAM(S): 10 TABLET, FILM COATED ORAL at 21:42

## 2021-03-16 NOTE — PROGRESS NOTE ADULT - ASSESSMENT
COVID-19 infection  metabolic encephalopathy due to COVID infection, resolved  chronic afib with secondary hypercoag state on coumadin, INR monitoring  essential HTN    [facility]  Last COVID yesterday.  continue current management.

## 2021-03-16 NOTE — PROGRESS NOTE ADULT - SUBJECTIVE AND OBJECTIVE BOX
s. pt cannot provide history due to her dementia.  She denies any concerns when I speak with her today, says she is feeling fine.  nursing reports no events overnight.    o.  Vital Signs Last 24 Hrs  T(C): 36.6 (16 Mar 2021 13:34), Max: 36.8 (15 Mar 2021 20:54)  T(F): 97.8 (16 Mar 2021 13:34), Max: 98.2 (15 Mar 2021 20:54)  HR: 59 (16 Mar 2021 13:34) (54 - 62)  BP: 127/69 (16 Mar 2021 13:34) (111/43 - 151/37)  BP(mean): --  RR: 18 (16 Mar 2021 13:34) (15 - 18)  SpO2: 98% (16 Mar 2021 13:34) (96% - 98%)    gen nad  lungs clear  abd soft nt  psyc awake, responsive    chemistry reviewed, stable.  INR 2.17

## 2021-03-17 DIAGNOSIS — I50.9 HEART FAILURE, UNSPECIFIED: ICD-10-CM

## 2021-03-17 LAB
ANION GAP SERPL CALC-SCNC: 7 MMOL/L — SIGNIFICANT CHANGE UP (ref 5–17)
BUN SERPL-MCNC: 18 MG/DL — SIGNIFICANT CHANGE UP (ref 7–18)
CALCIUM SERPL-MCNC: 8.9 MG/DL — SIGNIFICANT CHANGE UP (ref 8.4–10.5)
CHLORIDE SERPL-SCNC: 111 MMOL/L — HIGH (ref 96–108)
CO2 SERPL-SCNC: 25 MMOL/L — SIGNIFICANT CHANGE UP (ref 22–31)
CREAT SERPL-MCNC: 1.25 MG/DL — SIGNIFICANT CHANGE UP (ref 0.5–1.3)
GLUCOSE SERPL-MCNC: 96 MG/DL — SIGNIFICANT CHANGE UP (ref 70–99)
INR BLD: 2.09 RATIO — HIGH (ref 0.88–1.16)
POTASSIUM SERPL-MCNC: 3.9 MMOL/L — SIGNIFICANT CHANGE UP (ref 3.5–5.3)
POTASSIUM SERPL-SCNC: 3.9 MMOL/L — SIGNIFICANT CHANGE UP (ref 3.5–5.3)
PROTHROM AB SERPL-ACNC: 24 SEC — HIGH (ref 10.6–13.6)
SODIUM SERPL-SCNC: 143 MMOL/L — SIGNIFICANT CHANGE UP (ref 135–145)

## 2021-03-17 PROCEDURE — 99231 SBSQ HOSP IP/OBS SF/LOW 25: CPT

## 2021-03-17 RX ADMIN — CHLORHEXIDINE GLUCONATE 1 APPLICATION(S): 213 SOLUTION TOPICAL at 05:10

## 2021-03-17 RX ADMIN — MIRTAZAPINE 15 MILLIGRAM(S): 45 TABLET, ORALLY DISINTEGRATING ORAL at 21:04

## 2021-03-17 RX ADMIN — DONEPEZIL HYDROCHLORIDE 10 MILLIGRAM(S): 10 TABLET, FILM COATED ORAL at 21:04

## 2021-03-17 RX ADMIN — DULOXETINE HYDROCHLORIDE 60 MILLIGRAM(S): 30 CAPSULE, DELAYED RELEASE ORAL at 12:34

## 2021-03-17 RX ADMIN — PANTOPRAZOLE SODIUM 40 MILLIGRAM(S): 20 TABLET, DELAYED RELEASE ORAL at 07:18

## 2021-03-17 RX ADMIN — Medication 81 MILLIGRAM(S): at 12:34

## 2021-03-17 RX ADMIN — AMLODIPINE BESYLATE 2.5 MILLIGRAM(S): 2.5 TABLET ORAL at 05:10

## 2021-03-17 NOTE — PROGRESS NOTE ADULT - PROBLEM SELECTOR PROBLEM 2
Encephalopathy
COVID-19
Encephalopathy
Encephalopathy
COVID-19
COVID-19
Encephalopathy
Encephalopathy
COVID-19
COVID-19

## 2021-03-17 NOTE — PROGRESS NOTE ADULT - PROBLEM SELECTOR PROBLEM 7
Discharge planning issues
Prophylactic measure
Discharge planning issues
Discharge planning issues
Hypertension
Discharge planning issues
Discharge planning issues
Prophylactic measure

## 2021-03-17 NOTE — PROGRESS NOTE ADULT - PROBLEM SELECTOR PROBLEM 1
COVID-19
Encephalopathy
COVID-19
Encephalopathy
COVID-19
COVID-19
Encephalopathy
COVID-19

## 2021-03-17 NOTE — PROGRESS NOTE ADULT - PROBLEM SELECTOR PLAN 2
CTH noted above  Likely secondary to COVID infection  Plan as above
CTH noted above  Likely secondary to COVID infection  Plan as above
CTH without acute findings   encephalopathy likely due to delirium superimposed on dementia 2/2 COVID-19  sensorium has cleared, back to baseline mental status  avoid deliriogenic meds
Improved   Oxygen saturation on Room air >95% on RA  Not a candidate for Remdesivir or Dexamethasone this time
CTH noted above  Likely secondary to COVID infection  Plan as above
Oxygen saturation on Room air >95% on RA  Not a candidate for Remdesivir or Dexamethasone this time
Saturating well on RA  Not starting on decadron or remdesivir
CTH noted above  Likely secondary to COVID infection  Plan as above

## 2021-03-17 NOTE — PROGRESS NOTE ADULT - ASSESSMENT
88 year old female with past medical history of dementia, HTN, HF, A-Fib on coumadin, dementia, PPM who presented to ED with c/o lethargy and decreased PO intake for 5 days. Per patient's daughter, when attempting to have patient ambulate her legs "gave out" underneath her. Patient arrived to the ED deconditioned with altered mental status and found to be COVID-19 positive - AMS likely due to encephalopathy 2/2 coronavirus, UA negative, CTH without any acute findings. Admitted to medicine for further management - seen by PT who recommends [facility] for discharge, authorization pending.

## 2021-03-17 NOTE — PROGRESS NOTE ADULT - PROBLEM SELECTOR PROBLEM 5
Dementia
Hypertension
INR (international normal ratio) abnormal
Hypertension
Dementia

## 2021-03-17 NOTE — PROGRESS NOTE ADULT - PROBLEM SELECTOR PLAN 4
admission CXR without pulmonary vascular congestion  cardiomegaly  Lasix was held due to dehydration, will check BNP with AM labs  patient not clinically overloaded

## 2021-03-17 NOTE — PROGRESS NOTE ADULT - ATTENDING COMMENTS
Late entry
Patient seen and examined. Plan of care discussed with NP.  Imp:  Acute metabolic toxic encephalopathy likely secondary to COVID (non severe disease)  CHF chronic stable  Afib on Coumadin with sub Therapeutic INR,   Depression  Dementia
Patient seen and examined. Plan of care discussed with medical team     87 yo female with medical history significant for HTN, CHF, Afib on Coumadin, Dementia, comes in with lethargy and poor oral intake admitted for acute encephalopathy.    No overnight events.  Patient is at her baseline mental status now.    Imp  Acute metabolic toxic encephalopathy likely secondary to COVID (non severe disease) mental status- improved  CHF chronic stable  Afib on Coumadin now with supra therapeutic INR  Depression      Not a candidate  for Dexa or Remdesevir given non severe disease  PT evaluation recommended [facility], will stay inhouse until the infectious period is over (first COVID PCR positive on 3/2)  Coumadin 1 Mg tonight monitor INR, repeat INR in am  Daughter is agreeable for DC plan to [facility]  DVT ppx  Goals of care: DNR/DNI
87 yo female with medical history significant for HTN, CHF, Afib on Coumadin, Dementia, comes in with lethargy and poor oral intake admitted for acute encephalopathy likely related to COVID ( non severe disease).  Patient seen and examined on bedside. No new overnight issues. Patient is at her baseline mentation.    Imp  Acute metabolic toxic encephalopathy likely secondary to COVID (non severe disease) mental status- improved  CHF chronic stable  Afib on Coumadin now with supra therapeutic INR.   Depression      Not a candidate for Dexa or Remdesevir given non severe disease.  PT evaluation recommended [facility], will stay inhouse until the infectious period is over (first COVID PCR positive on 3/2)  will hold today coumadin dose, repeat INR tomorrow.  Daughter is agreeable for DC plan to [facility]. SW on board  Plan is to d/c to [facility] after 10 days of quarantine, i.e. on 03/12.  DVT ppx  Goals of care: DNR/DNI
case discussed with NP.  I have personally seen and evaluated the patient.  She seems to be at her baseline with her dementia, unable to provide meaningful history.  On exam lungs are clear.  Discussed with  case management; needs repeat PT evaluation and authorization in order to get SNF rehab approval, this is pending.  Medically appropriate for discharge.
89 yo female with medical history significant for HTN, CHF, Afib on Coumadin, Dementia, comes in with lethargy and poor oral intake admitted for acute encephalopathy likely related to COVID ( non severe disease).  Patient seen and examined on bedside. No new overnight issues.     Imp  Acute metabolic toxic encephalopathy likely secondary to COVID (non severe disease) mental status- improved  CHF chronic stable  Afib on Coumadin   supra therapeutic INR - improved  Depression      Not a candidate for Dexa or Remdesevir given non severe disease.  PT evaluation recommended [facility], will stay inhouse until the infectious period is over (first COVID PCR positive on 3/2)  will hold today coumadin dose, repeat INR today 2.48, will give home dose 2 mg today.  Repeat INR tomorrow.  Daughter is agreeable for DC plan to [facility].   Plan is to d/c to [facility] after 10 days of quarantine, i.e. on 03/12. CM on board  DVT ppx  Goals of care: DNR/DNI
87 yo female with medical history significant for HTN, CHF, Afib on Coumadin, Dementia, comes in with lethargy and poor oral intake admitted for acute encephalopathy likely related to COVID ( non severe disease).  Patient seen and examined on bedside. No new overnight issues.     Imp  Acute metabolic toxic encephalopathy likely secondary to COVID (non severe disease) mental status- improved  CHF chronic stable  Afib on Coumadin   supra therapeutic INR - improved  Depression      Not a candidate for Dexa or Remdesevir given non severe disease.  PT evaluation recommended [facility], will stay inhouse until the infectious period is over (first COVID PCR positive on 3/2).  Plan to d/c to [facility], 10 days of quarantine completed today, i.e. on 03/12.  On home dose of coumadin 2 mg od, repeat INR tomorrow am.  Daughter is agreeable for DC plan to [facility].   CM on board, plan is to d/c to [facility] pending authorization.  DVT ppx  Goals of care: DNR/DNI
87 yo female with medical history significant for HTN, CHF, Afib on Coumadin, Dementia, comes in with lethargy and poor oral intake admitted for acute encephalopathy likely related to COVID ( non severe disease).  Patient seen and examined on bedside. No new overnight issues. Patient is at her baseline mentation.    Imp  Acute metabolic toxic encephalopathy likely secondary to COVID (non severe disease) mental status- improved  CHF chronic stable  Afib on Coumadin now with supra therapeutic INR.   Depression      Not a candidate for Dexa or Remdesevir given non severe disease.  PT evaluation recommended [facility], will stay inhouse until the infectious period is over (first COVID PCR positive on 3/2)  will hold today coumadin dose, repeat INR tomorrow.  Daughter is agreeable for DC plan to [facility]. SW on board  Plan is to d/c to [facility] after 10 days of quarantine, i.e. on 03/12.  DVT ppx  Goals of care: DNR/DNI    (late entry note)
case discussed with NP.  I have personally seen and evaluated the patient.  Agree with documentation with following addenda:    Pt gave a little more history today; limited due to dementia.  Denied breathing difficulty.  Lungs clear on exam.    She is medically appropriate for discharge.  INR therapeutic.  Has insurance approval for SNF but now awaiting bed availability, so DC hopefully tomorrow.

## 2021-03-17 NOTE — PROGRESS NOTE ADULT - PROBLEM SELECTOR PLAN 1
patient tolerating room air, monitor O2 saturations   continue with isolation precautions  Dimer [id number]  supportive care

## 2021-03-17 NOTE — PROGRESS NOTE ADULT - SUBJECTIVE AND OBJECTIVE BOX
NP Note discussed with  Primary Attending      INTERVAL HPI/OVERNIGHT EVENTS: no new complaints    MEDICATIONS  (STANDING):  amLODIPine   Tablet 2.5 milliGRAM(s) Oral daily  aspirin enteric coated 81 milliGRAM(s) Oral daily  chlorhexidine 2% Cloths 1 Application(s) Topical <User Schedule>  dextrose 5%. 1000 milliLiter(s) (60 mL/Hr) IV Continuous <Continuous>  donepezil 10 milliGRAM(s) Oral at bedtime  DULoxetine 60 milliGRAM(s) Oral daily  mirtazapine 15 milliGRAM(s) Oral at bedtime  pantoprazole    Tablet 40 milliGRAM(s) Oral before breakfast    MEDICATIONS  (PRN):      __________________________________________________  REVIEW OF SYSTEMS:    CONSTITUTIONAL: No fever,   EYES: no acute visual disturbances  NECK: No pain or stiffness  RESPIRATORY: No cough; No shortness of breath  CARDIOVASCULAR: No chest pain, no palpitations  GASTROINTESTINAL: No pain. No nausea or vomiting; No diarrhea   NEUROLOGICAL: No headache or numbness, no tremors  MUSCULOSKELETAL: No joint pain, no muscle pain  GENITOURINARY: no dysuria, no frequency, no hesitancy  PSYCHIATRY: no depression , no anxiety  ALL OTHER  ROS negative        Vital Signs Last 24 Hrs  T(C): 36.4 (15 Mar 2021 13:12), Max: 36.5 (14 Mar 2021 21:05)  T(F): 97.6 (15 Mar 2021 13:12), Max: 97.7 (14 Mar 2021 21:05)  HR: 56 (15 Mar 2021 13:51) (55 - 60)  BP: 130/52 (15 Mar 2021 13:51) (117/48 - 130/52)  BP(mean): 72 (15 Mar 2021 13:51) (66 - 72)  RR: 14 (15 Mar 2021 13:12) (14 - 16)  SpO2: 98% (15 Mar 2021 13:51) (94% - 98%)    ________________________________________________  PHYSICAL EXAM:  GENERAL: NAD  HEENT: + edentulous, Normocephalic;  conjunctivae and sclerae clear; moist mucous membranes;   NECK : supple  CHEST/LUNG: Clear to auscultation bilaterally with good air entry   HEART: S1 S2  regular; no murmurs, gallops or rubs  ABDOMEN: Soft, Nontender, Nondistended; Bowel sounds present  EXTREMITIES: no cyanosis; no edema; no calf tenderness  SKIN: warm and dry; no rash  NERVOUS SYSTEM:  Awake and alert; Oriented x 1 ; no new deficits    _________________________________________________  LABS:                        14.2   8.63  )-----------( 329      ( 15 Mar 2021 06:22 )             43.5     03-15    143  |  113<H>  |  21<H>  ----------------------------<  94  4.2   |  24  |  1.03    Ca    8.2<L>      15 Mar 2021 06:22  Phos  3.4     03-15  Mg     2.2     03-15      PT/INR - ( 15 Mar 2021 06:22 )   PT: 33.7 sec;   INR: 2.98 ratio         PTT - ( 15 Mar 2021 06:22 )  PTT:48.8 sec    CAPILLARY BLOOD GLUCOSE            RADIOLOGY & ADDITIONAL TESTS:    Imaging Personally Reviewed:  YES  < from: Xray Chest 1 View- PORTABLE-Urgent (Xray Chest 1 View- PORTABLE-Urgent .) (03.02.21 @ 19:59) >    IMPRESSION: Bilateralinfiltrates. Other findings stable as above.    < from: CT Head No Cont (03.02.21 @ 17:56) >  IMPRESSION:  No acute intracranial hemorrhage or acute territorial infarction.    < end of copied text >    Consultant(s) Notes Reviewed:   YES

## 2021-03-17 NOTE — PROGRESS NOTE ADULT - PROBLEM SELECTOR PLAN 6
On coumadin
Pt takes Amlodipine at home  Continue with home regimen with parameters
Pt takes Amlodipine at home  Continue with home regimen with parameters
On coumadin
c/w donepezil  supportive care  avoid deliriogenic meds  frequent reorientation
Continue GI Prophylaxis  On Aspirin , Coumadin
Continue GI Prophylaxis  On Aspirin , Coumadin
Pt takes Amlodipine at home  Continue with home regimen with parameters
On coumadin
Pt takes Amlodipine at home  Continue with home regimen with parameters

## 2021-03-17 NOTE — CHART NOTE - NSCHARTNOTEFT_GEN_A_CORE
Assessment:     Factors impacting intake: [ ] none [ ] nausea  [ ] vomiting [ ] diarrhea [ ] constipation  [ ]chewing problems [ ] swallowing issues  [ ] other:     Diet Presciption: Diet, Dysphagia 2 Mechanical Soft-Nectar Consistency Fluid:   DASH/TLC {Sodium & Cholesterol Restricted} (03-09-21 @ 18:30)    Intake:     Daily   % Weight Change    Pertinent Medications: MEDICATIONS  (STANDING):  amLODIPine   Tablet 2.5 milliGRAM(s) Oral daily  aspirin enteric coated 81 milliGRAM(s) Oral daily  chlorhexidine 2% Cloths 1 Application(s) Topical <User Schedule>  dextrose 5%. 1000 milliLiter(s) (60 mL/Hr) IV Continuous <Continuous>  donepezil 10 milliGRAM(s) Oral at bedtime  DULoxetine 60 milliGRAM(s) Oral daily  mirtazapine 15 milliGRAM(s) Oral at bedtime  pantoprazole    Tablet 40 milliGRAM(s) Oral before breakfast    MEDICATIONS  (PRN):    Pertinent Labs: 03-17 Na143 mmol/L Glu 96 mg/dL K+ 3.9 mmol/L Cr  1.25 mg/dL BUN 18 mg/dL 03-15 Phos 3.4 mg/dL 03-03 Chol 104 mg/dL LDL --    HDL 31 mg/dL<L> Trig 122 mg/dL     CAPILLARY BLOOD GLUCOSE          Skin:     Estimated Needs:   [ ] no change since previous assessment  [ ] recalculated:     Previous Nutrition Diagnosis:   [ ] Inadequate Energy Intake [ ]Inadequate Oral Intake [ ] Excessive Energy Intake   [ ] Underweight [ ] Increased Nutrient Needs [ ] Overweight/Obesity  [ ] Swallowing Difficult   [ ] Altered GI Function [ ] Unintended Weight Loss [ ] Food & Nutrition Related Knowledge Deficit [ ] Malnutrition   [ ] Not Ready for Diet/Life Style Changes     Nutrition Diagnosis is [ ] ongoing  [ ] Improving   [ ] resolved [ ] not applicable     New Nutrition Diagnosis: [ ] not applicable       Interventions:   Recommend  [ ] Change Diet To:  [ ] Nutrition Supplement  [ ] Nutrition Support  [ ] Other:     Monitoring and Evaluation:   [ ] PO intake [ x ] Tolerance to diet prescription [ x ] weights [ x ] labs[ x ] follow up per protocol  [ ] other: Assessment:      Nutrition reassessment for follow-up. Chart reviewed, pt COVID19+fela, in airborne/contact isolation room, confused with dementia reported; LneY6I=6.4, no h/o DM, current Glu level=-94 x 3d, pt not a candidate for diet education at present; Pending discharge planning to skilled nursing facility for rehab when medically ready     Factors impacting intake: [ X ] swallowing issues  [ X ] other: persistent lack of appetite; acute on chronic comorbidities with Covid19 infection, cognitive deficit     Diet Prescription: Diet, Dysphagia 2 Mechanical Soft-Nectar Consistency Fluid:   DASH/TLC {Sodium & Cholesterol Restricted} (03-09-21 @ 18:30)    Intake: varied intake depending on food served and how pt feels, 40 to 100% intake per flow sheet, needs total feeding assistance     Daily   % Weight Change: 142.8 lb 3/3/2021; 145.5 lb 3/10/2021     Pertinent Medications: MEDICATIONS  (STANDING):  amLODIPine   Tablet 2.5 milliGRAM(s) Oral daily  aspirin enteric coated 81 milliGRAM(s) Oral daily  chlorhexidine 2% Cloths 1 Application(s) Topical <User Schedule>  dextrose 5%. 1000 milliLiter(s) (60 mL/Hr) IV Continuous <Continuous>  donepezil 10 milliGRAM(s) Oral at bedtime  DULoxetine 60 milliGRAM(s) Oral daily  mirtazapine 15 milliGRAM(s) Oral at bedtime  pantoprazole    Tablet 40 milliGRAM(s) Oral before breakfast    MEDICATIONS  (PRN):    Pertinent Labs: 03-17 Na143 mmol/L Glu 96 mg/dL K+ 3.9 mmol/L Cr  1.25 mg/dL BUN 18 mg/dL 03-15 Phos 3.4 mg/dL 03-03 Chol 104 mg/dL LDL --    HDL 31 mg/dL<L> Trig 122 mg/dL     CAPILLARY BLOOD GLUCOSE    Skin: intact     Estimated Needs:   [ X ] no change since previous assessment  [ ] recalculated:     Previous Nutrition Diagnosis:   [ X ]Inadequate Oral Intake     Nutrition Diagnosis is [ X ] ongoing  [ ] Improving   [ ] resolved [ ] not applicable     New Nutrition Diagnosis: [ X ] not applicable       Interventions:   Recommend  [ X ] Continue diet Rx as ordered   [ X ] Nutrition Supplement: May consider oral nutritional supplement ( Ensure Pudding ) if persistently decreased intake as medically feasible   [ ] Nutrition Support  [ X ] Other: Discussed with RN                    Swallow evaluation as medically feasible                    Nursing to continue feeding assistance and encouragement, aspiration precaution                    Pt to be followed by dietitian at skilled nursing facility when medically ready to be discharged     Monitoring and Evaluation:   [ X ] PO intake [ x ] Tolerance to diet prescription [ x ] weights [ x ] labs[ x ] follow up per protocol  [ ] other: Assessment:      Nutrition reassessment for follow-up. Chart reviewed, pt COVID19+fela, in airborne/contact isolation room, confused with dementia reported; MgdR3D=7.4, no h/o DM, current Glu level=-94 x 3d, pt not a candidate for diet education at present; Pending discharge planning to skilled nursing facility for rehab when medically ready     Factors impacting intake: [ X ] swallowing issues  [ X ] other: persistent lack of appetite; acute on chronic comorbidities with Covid19 infection, cognitive deficit     Diet Prescription: Diet, Dysphagia 2 Mechanical Soft-Nectar Consistency Fluid:   DASH/TLC {Sodium & Cholesterol Restricted} (03-09-21 @ 18:30)    Intake: varied intake depending on food served and how pt feels, 40 to 100% intake per flow sheet, needs total feeding assistance     Daily   % Weight Change: 142.8 lb 3/3/2021; 145.5 lb 3/10/2021     Pertinent Medications: MEDICATIONS  (STANDING):  amLODIPine   Tablet 2.5 milliGRAM(s) Oral daily  aspirin enteric coated 81 milliGRAM(s) Oral daily  chlorhexidine 2% Cloths 1 Application(s) Topical <User Schedule>  dextrose 5%. 1000 milliLiter(s) (60 mL/Hr) IV Continuous <Continuous>  donepezil 10 milliGRAM(s) Oral at bedtime  DULoxetine 60 milliGRAM(s) Oral daily  mirtazapine 15 milliGRAM(s) Oral at bedtime  pantoprazole    Tablet 40 milliGRAM(s) Oral before breakfast    MEDICATIONS  (PRN):    Pertinent Labs: 03-17 Na143 mmol/L Glu 96 mg/dL K+ 3.9 mmol/L Cr  1.25 mg/dL BUN 18 mg/dL 03-15 Phos 3.4 mg/dL 03-03 Chol 104 mg/dL LDL --    HDL 31 mg/dL<L> Trig 122 mg/dL     CAPILLARY BLOOD GLUCOSE    Skin: intact     Estimated Needs:   [ X ] no change since previous assessment  [ ] recalculated:     Previous Nutrition Diagnosis:   [ X ]Inadequate Oral Intake     Nutrition Diagnosis is [ X ] ongoing  [ ] Improving   [ ] resolved [ ] not applicable     New Nutrition Diagnosis: [ X ] not applicable       Interventions:   Recommend  [ X ] Continue diet Rx as ordered   [ X ] Nutrition Supplement: May consider oral nutritional supplement ( Ensure Pudding ) if persistently decreased intake as medically feasible   [ ] Nutrition Support  [ X ] Other: Discussed with RN                    Swallow evaluation as medically feasible                    Provide food choices within diet Rx as available/updated                    Nursing to continue feeding assistance and encouragement, aspiration precaution                    Pt to be followed by dietitian at skilled nursing facility when medically ready to be discharged     Monitoring and Evaluation:   [ X ] PO intake [ x ] Tolerance to diet prescription [ x ] weights [ x ] labs[ x ] follow up per protocol  [ ] other:

## 2021-03-17 NOTE — PROGRESS NOTE ADULT - PROBLEM SELECTOR PROBLEM 8
Discharge planning issues
Discharge planning issues
Prophylactic measure
Discharge planning issues
Discharge planning issues

## 2021-03-17 NOTE — PROGRESS NOTE ADULT - PROBLEM SELECTOR PROBLEM 6
Prophylactic measure
Prophylactic measure
Hypertension
Dementia
Prophylactic measure
Prophylactic measure
Hypertension
Prophylactic measure
Hypertension
Hypertension

## 2021-03-17 NOTE — PROGRESS NOTE ADULT - PROVIDER SPECIALTY LIST ADULT
Internal Medicine
Hospitalist
Hospitalist
Internal Medicine
Internal Medicine
Hospitalist
Internal Medicine

## 2021-03-17 NOTE — PROGRESS NOTE ADULT - PROBLEM SELECTOR PLAN 5
Controlled   'S  Continue home meds
Pt takes Donezepil at home  Continue with home regimen
resolved  GOAL: 2-3  check daily  coumadin dosing per INR
Controlled   'S  Continue home meds
Continue Amlodipine  Monitor Vital signs
Controlled  's   Continue Amlodipine  Monitor Vital signs
Pt takes Donezepil at home  Continue with home regimen
Pt takes Donezepil at home  Continue with home regimen
Controlled   'S  Continue home meds
Pt takes Donezepil at home  Continue with home regimen

## 2021-03-18 ENCOUNTER — TRANSCRIPTION ENCOUNTER (OUTPATIENT)
Age: 86
End: 2021-03-18

## 2021-03-18 VITALS
OXYGEN SATURATION: 96 % | RESPIRATION RATE: 18 BRPM | TEMPERATURE: 98 F | DIASTOLIC BLOOD PRESSURE: 59 MMHG | HEART RATE: 57 BPM | SYSTOLIC BLOOD PRESSURE: 117 MMHG

## 2021-03-18 LAB
ALBUMIN SERPL ELPH-MCNC: 2.6 G/DL — LOW (ref 3.5–5)
ALP SERPL-CCNC: 127 U/L — HIGH (ref 40–120)
ALT FLD-CCNC: 25 U/L DA — SIGNIFICANT CHANGE UP (ref 10–60)
ANION GAP SERPL CALC-SCNC: 5 MMOL/L — SIGNIFICANT CHANGE UP (ref 5–17)
APTT BLD: 36 SEC — HIGH (ref 27.5–35.5)
AST SERPL-CCNC: 21 U/L — SIGNIFICANT CHANGE UP (ref 10–40)
BILIRUB SERPL-MCNC: 0.7 MG/DL — SIGNIFICANT CHANGE UP (ref 0.2–1.2)
BUN SERPL-MCNC: 17 MG/DL — SIGNIFICANT CHANGE UP (ref 7–18)
CALCIUM SERPL-MCNC: 8.9 MG/DL — SIGNIFICANT CHANGE UP (ref 8.4–10.5)
CHLORIDE SERPL-SCNC: 112 MMOL/L — HIGH (ref 96–108)
CO2 SERPL-SCNC: 28 MMOL/L — SIGNIFICANT CHANGE UP (ref 22–31)
CREAT SERPL-MCNC: 1.04 MG/DL — SIGNIFICANT CHANGE UP (ref 0.5–1.3)
GLUCOSE SERPL-MCNC: 97 MG/DL — SIGNIFICANT CHANGE UP (ref 70–99)
HCT VFR BLD CALC: 43.2 % — SIGNIFICANT CHANGE UP (ref 34.5–45)
HGB BLD-MCNC: 14 G/DL — SIGNIFICANT CHANGE UP (ref 11.5–15.5)
INR BLD: 2.22 RATIO — HIGH (ref 0.88–1.16)
MCHC RBC-ENTMCNC: 27.8 PG — SIGNIFICANT CHANGE UP (ref 27–34)
MCHC RBC-ENTMCNC: 32.4 GM/DL — SIGNIFICANT CHANGE UP (ref 32–36)
MCV RBC AUTO: 85.9 FL — SIGNIFICANT CHANGE UP (ref 80–100)
NRBC # BLD: 0 /100 WBCS — SIGNIFICANT CHANGE UP (ref 0–0)
NT-PROBNP SERPL-SCNC: 313 PG/ML — SIGNIFICANT CHANGE UP (ref 0–450)
PLATELET # BLD AUTO: 320 K/UL — SIGNIFICANT CHANGE UP (ref 150–400)
POTASSIUM SERPL-MCNC: 4.1 MMOL/L — SIGNIFICANT CHANGE UP (ref 3.5–5.3)
POTASSIUM SERPL-SCNC: 4.1 MMOL/L — SIGNIFICANT CHANGE UP (ref 3.5–5.3)
PROT SERPL-MCNC: 5.7 G/DL — LOW (ref 6–8.3)
PROTHROM AB SERPL-ACNC: 25.4 SEC — HIGH (ref 10.6–13.6)
RBC # BLD: 5.03 M/UL — SIGNIFICANT CHANGE UP (ref 3.8–5.2)
RBC # FLD: 13.7 % — SIGNIFICANT CHANGE UP (ref 10.3–14.5)
SODIUM SERPL-SCNC: 145 MMOL/L — SIGNIFICANT CHANGE UP (ref 135–145)
WBC # BLD: 8.64 K/UL — SIGNIFICANT CHANGE UP (ref 3.8–10.5)
WBC # FLD AUTO: 8.64 K/UL — SIGNIFICANT CHANGE UP (ref 3.8–10.5)

## 2021-03-18 PROCEDURE — 71045 X-RAY EXAM CHEST 1 VIEW: CPT

## 2021-03-18 PROCEDURE — 82607 VITAMIN B-12: CPT

## 2021-03-18 PROCEDURE — 87640 STAPH A DNA AMP PROBE: CPT

## 2021-03-18 PROCEDURE — 87641 MR-STAPH DNA AMP PROBE: CPT

## 2021-03-18 PROCEDURE — 85025 COMPLETE CBC W/AUTO DIFF WBC: CPT

## 2021-03-18 PROCEDURE — 96372 THER/PROPH/DIAG INJ SC/IM: CPT

## 2021-03-18 PROCEDURE — 83735 ASSAY OF MAGNESIUM: CPT

## 2021-03-18 PROCEDURE — 87635 SARS-COV-2 COVID-19 AMP PRB: CPT

## 2021-03-18 PROCEDURE — 86769 SARS-COV-2 COVID-19 ANTIBODY: CPT

## 2021-03-18 PROCEDURE — 99285 EMERGENCY DEPT VISIT HI MDM: CPT | Mod: 25

## 2021-03-18 PROCEDURE — 82746 ASSAY OF FOLIC ACID SERUM: CPT

## 2021-03-18 PROCEDURE — 85730 THROMBOPLASTIN TIME PARTIAL: CPT

## 2021-03-18 PROCEDURE — 93005 ELECTROCARDIOGRAM TRACING: CPT

## 2021-03-18 PROCEDURE — 82728 ASSAY OF FERRITIN: CPT

## 2021-03-18 PROCEDURE — 87086 URINE CULTURE/COLONY COUNT: CPT

## 2021-03-18 PROCEDURE — 85652 RBC SED RATE AUTOMATED: CPT

## 2021-03-18 PROCEDURE — 83615 LACTATE (LD) (LDH) ENZYME: CPT

## 2021-03-18 PROCEDURE — 84443 ASSAY THYROID STIM HORMONE: CPT

## 2021-03-18 PROCEDURE — 85027 COMPLETE CBC AUTOMATED: CPT

## 2021-03-18 PROCEDURE — 81001 URINALYSIS AUTO W/SCOPE: CPT

## 2021-03-18 PROCEDURE — 70450 CT HEAD/BRAIN W/O DYE: CPT

## 2021-03-18 PROCEDURE — U0005: CPT

## 2021-03-18 PROCEDURE — 36415 COLL VENOUS BLD VENIPUNCTURE: CPT

## 2021-03-18 PROCEDURE — 80061 LIPID PANEL: CPT

## 2021-03-18 PROCEDURE — 83036 HEMOGLOBIN GLYCOSYLATED A1C: CPT

## 2021-03-18 PROCEDURE — 84100 ASSAY OF PHOSPHORUS: CPT

## 2021-03-18 PROCEDURE — 84145 PROCALCITONIN (PCT): CPT

## 2021-03-18 PROCEDURE — 85610 PROTHROMBIN TIME: CPT

## 2021-03-18 PROCEDURE — 99239 HOSP IP/OBS DSCHRG MGMT >30: CPT

## 2021-03-18 PROCEDURE — 82140 ASSAY OF AMMONIA: CPT

## 2021-03-18 PROCEDURE — 83880 ASSAY OF NATRIURETIC PEPTIDE: CPT

## 2021-03-18 PROCEDURE — 80053 COMPREHEN METABOLIC PANEL: CPT

## 2021-03-18 PROCEDURE — 82550 ASSAY OF CK (CPK): CPT

## 2021-03-18 PROCEDURE — 80048 BASIC METABOLIC PNL TOTAL CA: CPT

## 2021-03-18 PROCEDURE — 86140 C-REACTIVE PROTEIN: CPT

## 2021-03-18 PROCEDURE — 85379 FIBRIN DEGRADATION QUANT: CPT

## 2021-03-18 RX ORDER — DULOXETINE HYDROCHLORIDE 30 MG/1
1 CAPSULE, DELAYED RELEASE ORAL
Qty: 0 | Refills: 0 | DISCHARGE
Start: 2021-03-18

## 2021-03-18 RX ORDER — MIRTAZAPINE 45 MG/1
1 TABLET, ORALLY DISINTEGRATING ORAL
Qty: 0 | Refills: 0 | DISCHARGE
Start: 2021-03-18

## 2021-03-18 RX ORDER — DONEPEZIL HYDROCHLORIDE 10 MG/1
1 TABLET, FILM COATED ORAL
Qty: 0 | Refills: 0 | DISCHARGE
Start: 2021-03-18

## 2021-03-18 RX ORDER — AMLODIPINE BESYLATE 2.5 MG/1
0 TABLET ORAL
Qty: 0 | Refills: 5 | DISCHARGE

## 2021-03-18 RX ORDER — POTASSIUM CHLORIDE 20 MEQ
0 PACKET (EA) ORAL
Qty: 0 | Refills: 5 | DISCHARGE

## 2021-03-18 RX ORDER — ASPIRIN/CALCIUM CARB/MAGNESIUM 324 MG
1 TABLET ORAL
Qty: 0 | Refills: 0 | DISCHARGE
Start: 2021-03-18

## 2021-03-18 RX ORDER — AMLODIPINE BESYLATE 2.5 MG/1
1 TABLET ORAL
Qty: 0 | Refills: 0 | DISCHARGE
Start: 2021-03-18

## 2021-03-18 RX ADMIN — Medication 81 MILLIGRAM(S): at 12:02

## 2021-03-18 RX ADMIN — DULOXETINE HYDROCHLORIDE 60 MILLIGRAM(S): 30 CAPSULE, DELAYED RELEASE ORAL at 12:02

## 2021-03-18 RX ADMIN — AMLODIPINE BESYLATE 2.5 MILLIGRAM(S): 2.5 TABLET ORAL at 05:02

## 2021-03-18 RX ADMIN — CHLORHEXIDINE GLUCONATE 1 APPLICATION(S): 213 SOLUTION TOPICAL at 05:02

## 2021-03-18 RX ADMIN — PANTOPRAZOLE SODIUM 40 MILLIGRAM(S): 20 TABLET, DELAYED RELEASE ORAL at 05:02

## 2021-03-18 NOTE — DISCHARGE NOTE NURSING/CASE MANAGEMENT/SOCIAL WORK - PATIENT PORTAL LINK FT
You can access the FollowMyHealth Patient Portal offered by [facility] by registering at the following website: [email or web address]. By joining [employer]’s FollowMyHealth portal, you will also be able to view your health information using other applications (apps) compatible with our system.

## 2023-04-15 NOTE — PHYSICAL THERAPY INITIAL EVALUATION ADULT - BALANCE DISTURBANCE, SYSTEM IMPAIRMENT CONTRIBUTE, REHAB EVAL
cognitive/visual/musculoskeletal
You can access the FollowMyHealth Patient Portal offered by [facility] by registering at the following website: [email or web address]. By joining [employer]’s FollowMyHealth portal, you will also be able to view your health information using other applications (apps) compatible with our system.

## 2023-10-10 NOTE — PROGRESS NOTE ADULT - PROBLEM/PLAN-5
DISPLAY PLAN FREE TEXT
No
DISPLAY PLAN FREE TEXT

## 2024-08-30 NOTE — ED ADULT TRIAGE NOTE - DOMESTIC TRAVEL HIGH RISK QUESTION
[Reviewed Clinical Lab Test(s)] : Results of clinical tests were reviewed. [Reviewed Radiology Report(s)] : Radiology reports were reviewed. [Discuss Tests w/Referring Providers] : Results of labs/radiology studies and the treatment recommendations were discussed with performing/referring physician. [Discuss Alternatives/Risks/Benefits w/Patient] : All alternatives, risks, and benefits were discussed with the patient/family and all questions were answered.  Patient expressed good understanding and appreciates the importance of follow up as recommended. [Visit Time ___ Minutes] : [unfilled] minutes [Face to Face Time___ Minutes] : with [unfilled] minutes in face to face consultation. [FreeTextEntry1] : - Patients history and work up to date reviewed in detail. 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 Patient counseled that most infections detected over the years of screening are reactivations of latent infections that are acquired at or near sexual debut. Reactivation of a latent infection could imply waning immunity, and the patient might be at increased risk of persistence. Discussed this is particularly common in immunocompromised individuals albeit she doesnt currently have any risk factors for immunocompromise.  -Also discussed persistent HPV infection (defined as consecutively positive HPV results at least 12 months apart) is a necessary pathogenetic step for progression to clinically relevant disease. Most, if not all, patients with persistent HPV infection will be diagnosed with cervical intraepithelial neoplasia 2 or more (BECCA 2+) within five to seven years, many in as few as two years.  -After this extensive counseling shared decision made to proceed with colposcopy and biopsys. We also discussed rationale for EMB given her episode of vaginal spotting (PMB). Patient tolerated the procedures well and standard post procedure precautions provided. - Plan for TVUS given her pelvic discomfort. - For close interval follow up in 1 month to review work up to date and plan.  - I spent the time noted on the day of this patient encounter preparing for, providing and documenting the above service. I have counseled and educated the patient on the differential, workup, disease course, and treatment/management plan. Education was provided to the patient during this encounter. All questions and concerns were answered and addressed in detail.  No

## 2025-01-23 NOTE — ED ADULT NURSE NOTE - SKIN TEMPERATURE MOISTURE
Please call patient and let her know that her urine culture did not grow any bacteria. She can stop taking the Macrobid. She should follow up with her PCP or return to the walk in clinic if symptoms worsen or do not improve. warm